# Patient Record
Sex: MALE | Race: BLACK OR AFRICAN AMERICAN | NOT HISPANIC OR LATINO | URBAN - METROPOLITAN AREA
[De-identification: names, ages, dates, MRNs, and addresses within clinical notes are randomized per-mention and may not be internally consistent; named-entity substitution may affect disease eponyms.]

---

## 2023-06-08 ENCOUNTER — OFFICE VISIT (OUTPATIENT)
Dept: PRIMARY CARE | Facility: CLINIC | Age: 38
End: 2023-06-08
Payer: MEDICARE

## 2023-06-08 VITALS
BODY MASS INDEX: 43.99 KG/M2 | DIASTOLIC BLOOD PRESSURE: 85 MMHG | HEART RATE: 71 BPM | WEIGHT: 297 LBS | TEMPERATURE: 97.3 F | SYSTOLIC BLOOD PRESSURE: 134 MMHG | HEIGHT: 69 IN

## 2023-06-08 DIAGNOSIS — H61.20 WAX IN EAR: ICD-10-CM

## 2023-06-08 DIAGNOSIS — Z00.00 HEALTHCARE MAINTENANCE: ICD-10-CM

## 2023-06-08 DIAGNOSIS — E78.9 LIPID DISORDER: ICD-10-CM

## 2023-06-08 DIAGNOSIS — E55.9 VITAMIN D DEFICIENCY: Primary | ICD-10-CM

## 2023-06-08 DIAGNOSIS — D64.9 ANEMIA, UNSPECIFIED TYPE: ICD-10-CM

## 2023-06-08 LAB
ALANINE AMINOTRANSFERASE (SGPT) (U/L) IN SER/PLAS: 26 U/L (ref 10–52)
ALBUMIN (G/DL) IN SER/PLAS: 4.4 G/DL (ref 3.4–5)
ALKALINE PHOSPHATASE (U/L) IN SER/PLAS: 44 U/L (ref 33–120)
ANION GAP IN SER/PLAS: 16 MMOL/L (ref 10–20)
ASPARTATE AMINOTRANSFERASE (SGOT) (U/L) IN SER/PLAS: 28 U/L (ref 9–39)
BASOPHILS (10*3/UL) IN BLOOD BY AUTOMATED COUNT: 0.04 X10E9/L (ref 0–0.1)
BASOPHILS/100 LEUKOCYTES IN BLOOD BY AUTOMATED COUNT: 0.7 % (ref 0–2)
BILIRUBIN TOTAL (MG/DL) IN SER/PLAS: 0.7 MG/DL (ref 0–1.2)
CALCIUM (MG/DL) IN SER/PLAS: 10 MG/DL (ref 8.6–10.6)
CARBON DIOXIDE, TOTAL (MMOL/L) IN SER/PLAS: 24 MMOL/L (ref 21–32)
CHLORIDE (MMOL/L) IN SER/PLAS: 102 MMOL/L (ref 98–107)
CHOLESTEROL (MG/DL) IN SER/PLAS: 201 MG/DL (ref 0–199)
CHOLESTEROL IN HDL (MG/DL) IN SER/PLAS: 39.4 MG/DL
CHOLESTEROL/HDL RATIO: 5.1
CREATININE (MG/DL) IN SER/PLAS: 1.09 MG/DL (ref 0.5–1.3)
EOSINOPHILS (10*3/UL) IN BLOOD BY AUTOMATED COUNT: 0.19 X10E9/L (ref 0–0.7)
EOSINOPHILS/100 LEUKOCYTES IN BLOOD BY AUTOMATED COUNT: 3.3 % (ref 0–6)
ERYTHROCYTE DISTRIBUTION WIDTH (RATIO) BY AUTOMATED COUNT: 13.5 % (ref 11.5–14.5)
ERYTHROCYTE MEAN CORPUSCULAR HEMOGLOBIN CONCENTRATION (G/DL) BY AUTOMATED: 32.4 G/DL (ref 32–36)
ERYTHROCYTE MEAN CORPUSCULAR VOLUME (FL) BY AUTOMATED COUNT: 83 FL (ref 80–100)
ERYTHROCYTES (10*6/UL) IN BLOOD BY AUTOMATED COUNT: 5.38 X10E12/L (ref 4.5–5.9)
GFR MALE: 90 ML/MIN/1.73M2
GLUCOSE (MG/DL) IN SER/PLAS: 77 MG/DL (ref 74–99)
HEMATOCRIT (%) IN BLOOD BY AUTOMATED COUNT: 44.8 % (ref 41–52)
HEMOGLOBIN (G/DL) IN BLOOD: 14.5 G/DL (ref 13.5–17.5)
IMMATURE GRANULOCYTES/100 LEUKOCYTES IN BLOOD BY AUTOMATED COUNT: 0.3 % (ref 0–0.9)
LDL: 138 MG/DL (ref 0–99)
LEUKOCYTES (10*3/UL) IN BLOOD BY AUTOMATED COUNT: 5.7 X10E9/L (ref 4.4–11.3)
LYMPHOCYTES (10*3/UL) IN BLOOD BY AUTOMATED COUNT: 1.96 X10E9/L (ref 1.2–4.8)
LYMPHOCYTES/100 LEUKOCYTES IN BLOOD BY AUTOMATED COUNT: 34.2 % (ref 13–44)
MONOCYTES (10*3/UL) IN BLOOD BY AUTOMATED COUNT: 0.41 X10E9/L (ref 0.1–1)
MONOCYTES/100 LEUKOCYTES IN BLOOD BY AUTOMATED COUNT: 7.2 % (ref 2–10)
NEUTROPHILS (10*3/UL) IN BLOOD BY AUTOMATED COUNT: 3.11 X10E9/L (ref 1.2–7.7)
NEUTROPHILS/100 LEUKOCYTES IN BLOOD BY AUTOMATED COUNT: 54.3 % (ref 40–80)
NRBC (PER 100 WBCS) BY AUTOMATED COUNT: 0 /100 WBC (ref 0–0)
PLATELETS (10*3/UL) IN BLOOD AUTOMATED COUNT: 288 X10E9/L (ref 150–450)
POTASSIUM (MMOL/L) IN SER/PLAS: 4.9 MMOL/L (ref 3.5–5.3)
PROTEIN TOTAL: 8.1 G/DL (ref 6.4–8.2)
SODIUM (MMOL/L) IN SER/PLAS: 137 MMOL/L (ref 136–145)
TRIGLYCERIDE (MG/DL) IN SER/PLAS: 117 MG/DL (ref 0–149)
UREA NITROGEN (MG/DL) IN SER/PLAS: 14 MG/DL (ref 6–23)
VLDL: 23 MG/DL (ref 0–40)

## 2023-06-08 PROCEDURE — 80053 COMPREHEN METABOLIC PANEL: CPT

## 2023-06-08 PROCEDURE — 99385 PREV VISIT NEW AGE 18-39: CPT | Performed by: FAMILY MEDICINE

## 2023-06-08 PROCEDURE — 3008F BODY MASS INDEX DOCD: CPT | Performed by: FAMILY MEDICINE

## 2023-06-08 PROCEDURE — 80061 LIPID PANEL: CPT

## 2023-06-08 PROCEDURE — 85025 COMPLETE CBC W/AUTO DIFF WBC: CPT

## 2023-06-08 NOTE — PROGRESS NOTES
I saw and evaluated the patient. I personally obtained the key and critical portions of the history and physical exam. I reviewed the student 's documentation and discussed the patient with the student. I agree with the student medical decision making as documented in the student's note

## 2023-06-08 NOTE — PROGRESS NOTES
Pt is a 36 yo male reporting as a new patient for a physical exam. He denies any chief complaint.     Pt's mom says he has a developmental disability that makes it harder to answer some medical questions.    [facility]  Pt had twisted left ankle in 2006 when he slipped on detergent at [facility], and he hurt it again at  around 2009. He had air cast and crutches, but it still bothers him sometimes when working.     I asked him to consider getting a specific brand of work shoes with cushioned insoles designed for standing for long periods of time.    Pt reports no known allergies.      Pt is generally happy although somewhat stressful at work as it was previously shorthanded. Pt drinks socially (3/week), uses marijuana.     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 He typically takes a nap afterward. Sleeps about 10-11pm-7am. Has TV on when he goes to sleep but the TV is set on a 30 min timer. Pt's mom says he does     We counseled the patient about having a blocked off time for going to the gym for exercise, as well as having well-scheduled time for sleep without TV right before bed.     FH  Youngest brother (30), older brother (42), youngest sister (26). Dad had thyroid and prostate cancer but is now cancer-free. Mom's father had colon cancer, mom had uterine cancer, congestive heart failure. Pt's paternal grandma had breast cancer. Mom had mild heart attack at 25 yo.     PE  Upon physical exam, observed significant amount of ear wax accumulation bilaterally. Pt's mom notes pt has had swimmer's ear. Lungs are clear to auscultation. Heart has normal S1 S2. Abdomen soft and non-tender. Upon palpation, there is a little collection of fluid in the scrotum.    We will refer pt to ENT doctor regarding his ear wax accumulation.

## 2023-06-08 NOTE — PATIENT INSTRUCTIONS
Please try to get some more exercise at the gym and walking/biking outside once you get that bike fixed!  Please see if you can make some gradual improvements to your diet with less fatty, greasy, and sugary foods. We want you to lose some weight and get your blood pressure down. You are still young, so now is the time to try and decrease your risk for things like heart disease and diabetes.   Try to set a block of sleep at night without TV to improve your sleep schedule and your rest.

## 2023-09-08 ENCOUNTER — APPOINTMENT (OUTPATIENT)
Dept: PRIMARY CARE | Facility: CLINIC | Age: 38
End: 2023-09-08
Payer: MEDICARE

## 2023-09-29 PROBLEM — F32.9 MAJOR DEPRESSIVE DISORDER, SINGLE EPISODE, UNSPECIFIED: Status: ACTIVE | Noted: 2021-05-27

## 2023-09-29 PROBLEM — K62.5 BRBPR (BRIGHT RED BLOOD PER RECTUM): Status: ACTIVE | Noted: 2023-09-29

## 2023-09-29 PROBLEM — K60.2 ANAL FISSURE: Status: ACTIVE | Noted: 2023-09-29

## 2023-09-29 PROBLEM — K59.09 CHRONIC CONSTIPATION: Status: ACTIVE | Noted: 2023-09-29

## 2023-09-29 RX ORDER — DOCUSATE SODIUM 100 MG/1
1 CAPSULE, LIQUID FILLED ORAL DAILY
COMMUNITY
Start: 2017-05-02

## 2023-10-02 ENCOUNTER — APPOINTMENT (OUTPATIENT)
Dept: OTOLARYNGOLOGY | Facility: CLINIC | Age: 38
End: 2023-10-02
Payer: MEDICARE

## 2023-12-08 ENCOUNTER — APPOINTMENT (OUTPATIENT)
Dept: PRIMARY CARE | Facility: CLINIC | Age: 38
End: 2023-12-08
Payer: MEDICARE

## 2023-12-20 ENCOUNTER — OFFICE VISIT (OUTPATIENT)
Dept: PRIMARY CARE | Facility: CLINIC | Age: 38
End: 2023-12-20
Payer: MEDICARE

## 2023-12-20 ENCOUNTER — APPOINTMENT (OUTPATIENT)
Dept: PRIMARY CARE | Facility: CLINIC | Age: 38
End: 2023-12-20
Payer: MEDICARE

## 2023-12-20 VITALS
TEMPERATURE: 97.8 F | SYSTOLIC BLOOD PRESSURE: 127 MMHG | BODY MASS INDEX: 41.77 KG/M2 | DIASTOLIC BLOOD PRESSURE: 82 MMHG | HEIGHT: 69 IN | WEIGHT: 282 LBS

## 2023-12-20 DIAGNOSIS — R19.7 DIARRHEA, UNSPECIFIED TYPE: ICD-10-CM

## 2023-12-20 DIAGNOSIS — R11.0 NAUSEA: Primary | ICD-10-CM

## 2023-12-20 DIAGNOSIS — R05.1 ACUTE COUGH: ICD-10-CM

## 2023-12-20 PROCEDURE — G0446 INTENS BEHAVE THER CARDIO DX: HCPCS | Performed by: FAMILY MEDICINE

## 2023-12-20 PROCEDURE — 99214 OFFICE O/P EST MOD 30 MIN: CPT | Performed by: FAMILY MEDICINE

## 2023-12-20 PROCEDURE — 3008F BODY MASS INDEX DOCD: CPT | Performed by: FAMILY MEDICINE

## 2023-12-20 NOTE — PROGRESS NOTES
This is a 38-year-old male patient who is accompanied by his sister    This is his second visit here    Mother has informed me that he is mentally challenged and not able to give me a good history that is the reason for the sister to accompany him mother is out of town    I reviewed his lab test with the sister and advised the coronary artery disease risk since he has a high LDL and low HDL and we talked at length about lifestyle changes        I discussed and assess the  risk factors for cardiovascular disease.  I educated patient on a healthier behavior lifestyle changes.   I advised patient to walk at least 30 minutes a day 5 days a week.  I educated on healthy eating habits and also taking a baby aspirin to avoid cardiovascular accident       For the last 2 days he has been suffering with gastrointestinal upset such as vomiting and diarrhea but today he is feeling much better and it is slowing down and he has not thrown up for today and no diarrhea    Also he has a cough no shortness of breath lung exam is normal      Vital signs stable heart lung exam and abdominal exam no significant findings    Advised him to be on brat diet    Advised him to come back in 3 months

## 2023-12-20 NOTE — PATIENT INSTRUCTIONS
Looking at your family history you have a lot of cancer in your family the only way you can avoid getting cancer is changing lifestyle    By losing weight through eating lots of green leafy vegetable daily and exercising 5 days a week to sweat out you will never ever ever get cancer    Sleeping you should make sure that you sleep no more than 7 hours a day around the same time    Please make time to go to the gym almost daily and please try to eat 2 cups of leafy green vegetables      Your total cholesterol was 201 and the good cholesterol was only 38 extremely low good cholesterol and high cholesterol is 138 risk of premature heart attack and stroke please change your lifestyle okay          Since is getting better I can be wristlet sure that he had caught a virus that is getting better    But what he is eating can make him get worse again so for the next 2 days I want him to eat bananas ,boiled rice. BOILED  potatoes and toast avoid any milk products for the next 2 days except yogurt after 2 days you can start stepping up to regular food      I would like you to come back in 3 months at that time I will check your cholesterol

## 2024-05-14 ENCOUNTER — OFFICE VISIT (OUTPATIENT)
Dept: PRIMARY CARE | Facility: CLINIC | Age: 39
End: 2024-05-14
Payer: MEDICARE

## 2024-05-14 VITALS
DIASTOLIC BLOOD PRESSURE: 90 MMHG | TEMPERATURE: 98.4 F | SYSTOLIC BLOOD PRESSURE: 131 MMHG | BODY MASS INDEX: 40.39 KG/M2 | HEART RATE: 76 BPM | HEIGHT: 69 IN | WEIGHT: 272.7 LBS

## 2024-05-14 DIAGNOSIS — R79.9 ABNORMAL FINDING OF BLOOD CHEMISTRY, UNSPECIFIED: ICD-10-CM

## 2024-05-14 DIAGNOSIS — Z00.00 ROUTINE GENERAL MEDICAL EXAMINATION AT A HEALTH CARE FACILITY: ICD-10-CM

## 2024-05-14 DIAGNOSIS — I10 ESSENTIAL (PRIMARY) HYPERTENSION: ICD-10-CM

## 2024-05-14 DIAGNOSIS — E78.9 LIPID DISORDER: Primary | ICD-10-CM

## 2024-05-14 DIAGNOSIS — R03.0 ELEVATED BLOOD PRESSURE READING WITHOUT DIAGNOSIS OF HYPERTENSION: Chronic | ICD-10-CM

## 2024-05-14 LAB
ALBUMIN SERPL BCP-MCNC: 4 G/DL (ref 3.4–5)
ALP SERPL-CCNC: 49 U/L (ref 33–120)
ALT SERPL W P-5'-P-CCNC: 21 U/L (ref 10–52)
ANION GAP SERPL CALC-SCNC: 14 MMOL/L (ref 10–20)
AST SERPL W P-5'-P-CCNC: 22 U/L (ref 9–39)
BASOPHILS # BLD AUTO: 0.04 X10*3/UL (ref 0–0.1)
BASOPHILS NFR BLD AUTO: 0.7 %
BILIRUB SERPL-MCNC: 0.9 MG/DL (ref 0–1.2)
BUN SERPL-MCNC: 11 MG/DL (ref 6–23)
CALCIUM SERPL-MCNC: 9.2 MG/DL (ref 8.6–10.6)
CHLORIDE SERPL-SCNC: 103 MMOL/L (ref 98–107)
CHOLEST SERPL-MCNC: 158 MG/DL (ref 0–199)
CHOLESTEROL/HDL RATIO: 4.2
CO2 SERPL-SCNC: 24 MMOL/L (ref 21–32)
CREAT SERPL-MCNC: 1.1 MG/DL (ref 0.5–1.3)
EGFRCR SERPLBLD CKD-EPI 2021: 89 ML/MIN/1.73M*2
EOSINOPHIL # BLD AUTO: 0.25 X10*3/UL (ref 0–0.7)
EOSINOPHIL NFR BLD AUTO: 4.1 %
ERYTHROCYTE [DISTWIDTH] IN BLOOD BY AUTOMATED COUNT: 13.5 % (ref 11.5–14.5)
EST. AVERAGE GLUCOSE BLD GHB EST-MCNC: 117 MG/DL
GLUCOSE SERPL-MCNC: 88 MG/DL (ref 74–99)
HBA1C MFR BLD: 5.7 %
HCT VFR BLD AUTO: 43.2 % (ref 41–52)
HDLC SERPL-MCNC: 37.3 MG/DL
HGB BLD-MCNC: 14 G/DL (ref 13.5–17.5)
IMM GRANULOCYTES # BLD AUTO: 0.01 X10*3/UL (ref 0–0.7)
IMM GRANULOCYTES NFR BLD AUTO: 0.2 % (ref 0–0.9)
LDLC SERPL CALC-MCNC: 95 MG/DL
LYMPHOCYTES # BLD AUTO: 2.49 X10*3/UL (ref 1.2–4.8)
LYMPHOCYTES NFR BLD AUTO: 40.8 %
MCH RBC QN AUTO: 27.2 PG (ref 26–34)
MCHC RBC AUTO-ENTMCNC: 32.4 G/DL (ref 32–36)
MCV RBC AUTO: 84 FL (ref 80–100)
MONOCYTES # BLD AUTO: 0.33 X10*3/UL (ref 0.1–1)
MONOCYTES NFR BLD AUTO: 5.4 %
NEUTROPHILS # BLD AUTO: 2.98 X10*3/UL (ref 1.2–7.7)
NEUTROPHILS NFR BLD AUTO: 48.8 %
NON HDL CHOLESTEROL: 121 MG/DL (ref 0–149)
NRBC BLD-RTO: 0 /100 WBCS (ref 0–0)
PLATELET # BLD AUTO: 267 X10*3/UL (ref 150–450)
POTASSIUM SERPL-SCNC: 4.1 MMOL/L (ref 3.5–5.3)
PROT SERPL-MCNC: 7.8 G/DL (ref 6.4–8.2)
RBC # BLD AUTO: 5.15 X10*6/UL (ref 4.5–5.9)
SODIUM SERPL-SCNC: 137 MMOL/L (ref 136–145)
TRIGL SERPL-MCNC: 130 MG/DL (ref 0–149)
VLDL: 26 MG/DL (ref 0–40)
WBC # BLD AUTO: 6.1 X10*3/UL (ref 4.4–11.3)

## 2024-05-14 PROCEDURE — 3008F BODY MASS INDEX DOCD: CPT | Performed by: FAMILY MEDICINE

## 2024-05-14 PROCEDURE — 3080F DIAST BP >= 90 MM HG: CPT | Performed by: FAMILY MEDICINE

## 2024-05-14 PROCEDURE — 36415 COLL VENOUS BLD VENIPUNCTURE: CPT

## 2024-05-14 PROCEDURE — 1036F TOBACCO NON-USER: CPT | Performed by: FAMILY MEDICINE

## 2024-05-14 PROCEDURE — 84443 ASSAY THYROID STIM HORMONE: CPT

## 2024-05-14 PROCEDURE — 85025 COMPLETE CBC W/AUTO DIFF WBC: CPT

## 2024-05-14 PROCEDURE — 3075F SYST BP GE 130 - 139MM HG: CPT | Performed by: FAMILY MEDICINE

## 2024-05-14 PROCEDURE — 83036 HEMOGLOBIN GLYCOSYLATED A1C: CPT

## 2024-05-14 PROCEDURE — 99395 PREV VISIT EST AGE 18-39: CPT | Performed by: FAMILY MEDICINE

## 2024-05-14 PROCEDURE — 80061 LIPID PANEL: CPT

## 2024-05-14 PROCEDURE — 80053 COMPREHEN METABOLIC PANEL: CPT

## 2024-05-14 NOTE — PROGRESS NOTES
"Subjective   Patient ID: Omari Mcneil is a 38 y.o. male who presents for Follow-up.    HPI     Review of Systems   All other systems reviewed and are negative.      Objective   /90   Pulse 76   Temp 36.9 °C (98.4 °F)   Ht 1.753 m (5' 9\")   Wt 124 kg (272 lb 11.2 oz)   BMI 40.27 kg/m²     Physical Exam  Cardiovascular:      Rate and Rhythm: Regular rhythm.   Pulmonary:      Breath sounds: Normal breath sounds.   Abdominal:      Palpations: Abdomen is soft.   Musculoskeletal:         General: Normal range of motion.      Cervical back: Normal range of motion.   Skin:     General: Skin is warm.   Neurological:      General: No focal deficit present.      Mental Status: He is alert.   Psychiatric:         Mood and Affect: Mood normal.         Assessment/Plan       Patient is accompanied by his mom for his annual physical exam    He has no complaints    Vital signs shows that his BMI is over 40     The last diastolic blood pressure is 90    He is mentally challenged but he is able to understand about diet and exercise    I will shari all his routine blood work advised mother to bring him back in 6 months         "

## 2024-05-15 LAB — TSH SERPL-ACNC: 0.86 MIU/L (ref 0.44–3.98)

## 2024-11-06 ENCOUNTER — APPOINTMENT (OUTPATIENT)
Dept: PRIMARY CARE | Facility: CLINIC | Age: 39
End: 2024-11-06
Payer: MEDICARE

## 2025-01-13 ENCOUNTER — APPOINTMENT (OUTPATIENT)
Dept: PRIMARY CARE | Facility: CLINIC | Age: 40
End: 2025-01-13
Payer: MEDICARE

## 2025-02-07 ENCOUNTER — APPOINTMENT (OUTPATIENT)
Dept: RADIOLOGY | Facility: HOSPITAL | Age: 40
End: 2025-02-07
Payer: MEDICARE

## 2025-02-07 ENCOUNTER — HOSPITAL ENCOUNTER (EMERGENCY)
Facility: HOSPITAL | Age: 40
Discharge: HOME | End: 2025-02-07
Attending: EMERGENCY MEDICINE
Payer: MEDICARE

## 2025-02-07 ENCOUNTER — APPOINTMENT (OUTPATIENT)
Dept: CARDIOLOGY | Facility: HOSPITAL | Age: 40
End: 2025-02-07
Payer: MEDICARE

## 2025-02-07 VITALS
HEART RATE: 68 BPM | DIASTOLIC BLOOD PRESSURE: 91 MMHG | TEMPERATURE: 97.7 F | RESPIRATION RATE: 20 BRPM | WEIGHT: 272 LBS | HEIGHT: 69 IN | BODY MASS INDEX: 40.29 KG/M2 | SYSTOLIC BLOOD PRESSURE: 163 MMHG | OXYGEN SATURATION: 98 %

## 2025-02-07 DIAGNOSIS — R60.0 LOCALIZED EDEMA: ICD-10-CM

## 2025-02-07 DIAGNOSIS — I82.4Y1 ACUTE DEEP VEIN THROMBOSIS (DVT) OF PROXIMAL VEIN OF RIGHT LOWER EXTREMITY (MULTI): Primary | ICD-10-CM

## 2025-02-07 PROCEDURE — 93971 EXTREMITY STUDY: CPT | Performed by: STUDENT IN AN ORGANIZED HEALTH CARE EDUCATION/TRAINING PROGRAM

## 2025-02-07 PROCEDURE — 93971 EXTREMITY STUDY: CPT

## 2025-02-07 PROCEDURE — 99285 EMERGENCY DEPT VISIT HI MDM: CPT | Mod: 25 | Performed by: EMERGENCY MEDICINE

## 2025-02-07 PROCEDURE — 2500000001 HC RX 250 WO HCPCS SELF ADMINISTERED DRUGS (ALT 637 FOR MEDICARE OP)

## 2025-02-07 PROCEDURE — 73590 X-RAY EXAM OF LOWER LEG: CPT | Mod: RIGHT SIDE | Performed by: RADIOLOGY

## 2025-02-07 PROCEDURE — 99284 EMERGENCY DEPT VISIT MOD MDM: CPT | Mod: 25

## 2025-02-07 PROCEDURE — 99285 EMERGENCY DEPT VISIT HI MDM: CPT | Performed by: EMERGENCY MEDICINE

## 2025-02-07 PROCEDURE — 73590 X-RAY EXAM OF LOWER LEG: CPT | Mod: RT

## 2025-02-07 PROCEDURE — 2500000002 HC RX 250 W HCPCS SELF ADMINISTERED DRUGS (ALT 637 FOR MEDICARE OP, ALT 636 FOR OP/ED)

## 2025-02-07 RX ORDER — ORPHENADRINE CITRATE 100 MG/1
100 TABLET, EXTENDED RELEASE ORAL ONCE
Status: COMPLETED | OUTPATIENT
Start: 2025-02-07 | End: 2025-02-07

## 2025-02-07 RX ADMIN — APIXABAN 10 MG: 5 TABLET, FILM COATED ORAL at 23:12

## 2025-02-07 RX ADMIN — ORPHENADRINE CITRATE 100 MG: 100 TABLET, EXTENDED RELEASE ORAL at 20:30

## 2025-02-07 ASSESSMENT — PAIN DESCRIPTION - LOCATION
LOCATION: LEG

## 2025-02-07 ASSESSMENT — PAIN DESCRIPTION - ORIENTATION
ORIENTATION: RIGHT
ORIENTATION: RIGHT

## 2025-02-07 ASSESSMENT — COLUMBIA-SUICIDE SEVERITY RATING SCALE - C-SSRS
1. IN THE PAST MONTH, HAVE YOU WISHED YOU WERE DEAD OR WISHED YOU COULD GO TO SLEEP AND NOT WAKE UP?: NO
2. HAVE YOU ACTUALLY HAD ANY THOUGHTS OF KILLING YOURSELF?: NO
6. HAVE YOU EVER DONE ANYTHING, STARTED TO DO ANYTHING, OR PREPARED TO DO ANYTHING TO END YOUR LIFE?: NO

## 2025-02-07 ASSESSMENT — PAIN DESCRIPTION - DESCRIPTORS: DESCRIPTORS: ACHING

## 2025-02-07 ASSESSMENT — PAIN DESCRIPTION - PROGRESSION: CLINICAL_PROGRESSION: GRADUALLY IMPROVING

## 2025-02-07 ASSESSMENT — PAIN SCALES - GENERAL
PAINLEVEL_OUTOF10: 5 - MODERATE PAIN
PAINLEVEL_OUTOF10: 4
PAINLEVEL_OUTOF10: 10 - WORST POSSIBLE PAIN
PAINLEVEL_OUTOF10: 9

## 2025-02-07 ASSESSMENT — PAIN - FUNCTIONAL ASSESSMENT
PAIN_FUNCTIONAL_ASSESSMENT: 0-10
PAIN_FUNCTIONAL_ASSESSMENT: 0-10

## 2025-02-07 ASSESSMENT — PAIN DESCRIPTION - PAIN TYPE
TYPE: ACUTE PAIN
TYPE: ACUTE PAIN

## 2025-02-07 ASSESSMENT — PAIN DESCRIPTION - FREQUENCY: FREQUENCY: INTERMITTENT

## 2025-02-07 ASSESSMENT — PAIN DESCRIPTION - ONSET: ONSET: ONGOING

## 2025-02-08 ENCOUNTER — APPOINTMENT (OUTPATIENT)
Dept: RADIOLOGY | Facility: HOSPITAL | Age: 40
End: 2025-02-08
Payer: MEDICARE

## 2025-02-08 ENCOUNTER — HOSPITAL ENCOUNTER (EMERGENCY)
Facility: HOSPITAL | Age: 40
Discharge: HOME | End: 2025-02-08
Attending: EMERGENCY MEDICINE
Payer: MEDICARE

## 2025-02-08 VITALS
RESPIRATION RATE: 18 BRPM | WEIGHT: 272 LBS | OXYGEN SATURATION: 98 % | SYSTOLIC BLOOD PRESSURE: 135 MMHG | TEMPERATURE: 99 F | HEART RATE: 74 BPM | HEIGHT: 69 IN | BODY MASS INDEX: 40.29 KG/M2 | DIASTOLIC BLOOD PRESSURE: 93 MMHG

## 2025-02-08 DIAGNOSIS — R51.9 OCCIPITAL HEADACHE: Primary | ICD-10-CM

## 2025-02-08 PROCEDURE — 70450 CT HEAD/BRAIN W/O DYE: CPT

## 2025-02-08 PROCEDURE — 70450 CT HEAD/BRAIN W/O DYE: CPT | Performed by: RADIOLOGY

## 2025-02-08 PROCEDURE — 99284 EMERGENCY DEPT VISIT MOD MDM: CPT | Mod: 25 | Performed by: EMERGENCY MEDICINE

## 2025-02-08 PROCEDURE — 2500000004 HC RX 250 GENERAL PHARMACY W/ HCPCS (ALT 636 FOR OP/ED)

## 2025-02-08 PROCEDURE — 96361 HYDRATE IV INFUSION ADD-ON: CPT

## 2025-02-08 PROCEDURE — 96374 THER/PROPH/DIAG INJ IV PUSH: CPT

## 2025-02-08 PROCEDURE — 99284 EMERGENCY DEPT VISIT MOD MDM: CPT | Performed by: EMERGENCY MEDICINE

## 2025-02-08 PROCEDURE — 96375 TX/PRO/DX INJ NEW DRUG ADDON: CPT

## 2025-02-08 RX ORDER — DIPHENHYDRAMINE HYDROCHLORIDE 50 MG/ML
25 INJECTION INTRAMUSCULAR; INTRAVENOUS ONCE
Status: COMPLETED | OUTPATIENT
Start: 2025-02-08 | End: 2025-02-08

## 2025-02-08 RX ORDER — PROCHLORPERAZINE EDISYLATE 5 MG/ML
10 INJECTION INTRAMUSCULAR; INTRAVENOUS ONCE
Status: COMPLETED | OUTPATIENT
Start: 2025-02-08 | End: 2025-02-08

## 2025-02-08 RX ADMIN — SODIUM CHLORIDE 500 ML: 9 INJECTION, SOLUTION INTRAVENOUS at 16:26

## 2025-02-08 RX ADMIN — DIPHENHYDRAMINE HYDROCHLORIDE 25 MG: 50 INJECTION, SOLUTION INTRAMUSCULAR; INTRAVENOUS at 16:26

## 2025-02-08 RX ADMIN — PROCHLORPERAZINE EDISYLATE 10 MG: 5 INJECTION INTRAMUSCULAR; INTRAVENOUS at 16:25

## 2025-02-08 ASSESSMENT — COLUMBIA-SUICIDE SEVERITY RATING SCALE - C-SSRS
2. HAVE YOU ACTUALLY HAD ANY THOUGHTS OF KILLING YOURSELF?: NO
1. IN THE PAST MONTH, HAVE YOU WISHED YOU WERE DEAD OR WISHED YOU COULD GO TO SLEEP AND NOT WAKE UP?: NO
6. HAVE YOU EVER DONE ANYTHING, STARTED TO DO ANYTHING, OR PREPARED TO DO ANYTHING TO END YOUR LIFE?: NO

## 2025-02-08 ASSESSMENT — PAIN SCALES - GENERAL
PAINLEVEL_OUTOF10: 8
PAINLEVEL_OUTOF10: 8
PAINLEVEL_OUTOF10: 2

## 2025-02-08 ASSESSMENT — PAIN - FUNCTIONAL ASSESSMENT
PAIN_FUNCTIONAL_ASSESSMENT: 0-10
PAIN_FUNCTIONAL_ASSESSMENT: 0-10

## 2025-02-08 ASSESSMENT — PAIN DESCRIPTION - DESCRIPTORS
DESCRIPTORS: HEADACHE
DESCRIPTORS: HEADACHE

## 2025-02-08 ASSESSMENT — PAIN DESCRIPTION - LOCATION: LOCATION: HEAD

## 2025-02-08 ASSESSMENT — PAIN DESCRIPTION - PAIN TYPE: TYPE: ACUTE PAIN

## 2025-02-08 NOTE — DISCHARGE INSTRUCTIONS
You are being started on blood thinners.  Please note that if you fall and hit your head on a blood thinner or have trauma to the head while taking the blood thinner you need to come back to the emergency department for reevaluation.  Otherwise he may have longer bleeding times if you excellently cut yourself.  If you have any vomiting blood, blood in your stool please come in for evaluation immediately.    In regards to the blood clot if you start to have any chest pain or shortness of breath please return to the ED for reevaluation.

## 2025-02-08 NOTE — ED PROVIDER NOTES
Emergency Department Provider Note        History of Present Illness     History provided by: Patient  Limitations to History: None  External Records Reviewed with Brief Summary: None    HPI:  Omari Mcneil is a 39 y.o. morbid obese male with a history of MDD, and recent diagnosis of DVT in the right lower extremity, who presents to the ED with complaint of headache.  Patient stated that he was sitting down watching TV when the headache started.  He decided to take a nap hoping that the headache will resolve, but the headache woke him up.  States that the headache started on the occipital and radiates to the top of his head.  Patient was seen in the ED yesterday for right lower extremity pain, and was diagnosed with DVT.  He was started on anticoagulant with 10 mg of Eliquis.  He received a prescription to continue outpatient treatment, but has not picked it up yet.    Physical Exam   Triage vitals:  T 37.2 °C (99 °F)  HR 74  BP (!) 154/100  RR 18  O2 94 % None (Room air)    General: Awake, alert, in no acute distress  Eyes: Gaze conjugate.  No scleral icterus or injection  HENT: Normo-cephalic, atraumatic. No stridor  CV: Regular rate, regular rhythm. Radial pulses 2+ bilaterally  Resp: Breathing non-labored, speaking in full sentences.  Clear to auscultation bilaterally  GI: Soft, non-distended, non-tender. No rebound or guarding.  : Not examined  MSK/Extremities: No gross bony deformities. Moving all extremities  Skin: Warm. Appropriate color  Neuro: Alert. Oriented. Face symmetric. Speech is fluent.  Gross strength and sensation intact in b/l UE and LEs  Psych: Appropriate mood and affect    Medical Decision Making & ED Course   Medical Decision Makin y.o. male ***    On arrival to the ED, the patient was stable, A&Ox3, non-toxic, well-appearing, and in no acute distress. Primary assessment is evident for intact ABC. The patient was able to give account of current event and verbalize presenting  symptoms.    My personal assessment is directed towards ordering labs including CBC, CMP, coagulation status and key cardiac enzyme to rule out anemia, electrolyte abnormality, and cardiac event. Also, I ordered EKG and chest x-ray to rule out possible cardiopulmonary abnormality, or critical esophageal etiology.    Lab shows no evidence of significant anemia,  ----  Differential diagnoses considered include but are not limited to: ***    Care Considerations: As documented above in [facility]    ED Course:  Diagnoses as of 02/08/25 1903   Occipital headache     Disposition   As a result of the work-up, the patient was discharged home.  he was informed of his diagnosis and instructed to come back with any concerns or worsening of condition.  he and was agreeable to the plan as discussed above.  he was given the opportunity to ask questions.  All of the patient's questions were answered.    Procedures   Procedures    This was a shared visit with an ED attending.  The patient was seen and discussed with the ED attending Dr. Cheatham.    Eyal Vickers MD  Emergency Medicine, PGY-2   resident/fellow.  I have personally performed a substantive portion of the encounter.  I have seen and examined the patient; agree with the workup, evaluation, MDM, management and diagnosis.  The care plan has been discussed with the resident; I have reviewed the resident’s note and agree with the documented findings.                                                    Alan Cheatham,   02/11/25 [id number]

## 2025-02-08 NOTE — ED PROVIDER NOTES
EMERGENCY DEPARTMENT ENCOUNTER      Pt Name: Omari Mnceil  MRN: [id number]  Birthdate 1985  Date of evaluation: 2/7/2025    HISTORY OF PRESENT ILLNESS    Omari Mcneil is an 39 y.o. male with history including developmental delay presenting to the emergency department for pain to his right lower extremity.  2 days ago patient started working out which she had not done in a couple months.  After the workout he started to have pain in his right lower extremity.  He went to an urgent care that evening where they told him it was a muscle strain and discharged him home.  Patient was instructed to take Aleve.  He tried taking some earlier today with no improvement of his pain.  Patient states that he cannot ambulate secondary to the pain and discomfort.  He denies feeling a pop or pulling sensation during his workout.  Patient did not fall or injure himself.  No recent travel or surgeries.    PAST MEDICAL HISTORY   No past medical history on file.    SURGICAL HISTORY       Past Surgical History:   Procedure Laterality Date    NO PAST SURGERIES         CURRENT MEDICATIONS       Previous Medications    DOCUSATE SODIUM (COLACE) 100 MG CAPSULE    Take 1 capsule (100 mg) by mouth once daily.    DOCUSATE SODIUM (COLACE) 50 MG CAPSULE    Take 1 capsule (50 mg) by mouth as needed at bedtime for constipation.    PSYLLIUM (KONSYL) 6 GRAM PACKET    Take by mouth once daily. Take 1 spoonful daily with glass of water  100% powder       ALLERGIES     Patient has no known allergies.    FAMILY HISTORY       Family History   Problem Relation Name Age of Onset    Heart attack Mother      Hypothyroidism Mother      Other (prediabetes) Mother      Uterine cancer Maternal Grandmother      Colon cancer Maternal Grandfather      Hypertension Paternal Grandfather          SOCIAL HISTORY       Social History     Socioeconomic History    Marital status: Single   Tobacco Use    Smoking status: Never    Smokeless tobacco: Never   Substance  and Sexual Activity    Alcohol use: Yes    Drug use: Yes     Types: Marijuana     Social Drivers of Health     Financial Resource Strain: Not on File (2021)    [facility] from [facility]    Financial Resource Strain     Financial Resource Strain: 0   Food Insecurity: Not on File (2021)    Received from [facility]    Food Insecurity     Food: 0   Transportation Needs: Not on File (2021)    Received from [facility]    Transportation Needs     Transportation: 0   Physical Activity: Not on File (2021)    Received from [facility]    Physical Activity     Physical Activity: 0   Stress: Not on File (2021)    Received from [facility]    Stress     Stress: 0   Social Connections: Not on File (2021)    Received from [facility]    Social Connections     Social Connections and Isolation: 0   Housing Stability: Not on File (2021)    Received from [facility]    Housing Stability     Housin       PHYSICAL EXAM       ED Triage Vitals [25]   Temperature Heart Rate Respirations BP   36.3 °C (97.3 °F) 85 20 (!) 178/102      Pulse Ox Temp Source Heart Rate Source Patient Position   99 % Temporal Monitor Sitting      BP Location FiO2 (%)     Right arm --       Physical Exam  Vitals and nursing note reviewed.   Constitutional:       General: He is not in acute distress.     Appearance: He is well-developed.   HENT:      Head: Normocephalic and atraumatic.   Cardiovascular:      Rate and Rhythm: Normal rate and regular rhythm.      Heart sounds: No murmur heard.  Pulmonary:      Effort: Pulmonary effort is normal. No respiratory distress.      Breath sounds: Normal breath sounds.   Musculoskeletal:         General: Swelling (Compared to the left lower extremity) and tenderness (Calf) present.   Skin:     General: Skin is warm and dry.      Capillary Refill: Capillary refill takes less than 2 seconds.   Neurological:      General: No focal deficit present.      Mental Status: He  is alert and oriented to person, place, and time.   Psychiatric:         Mood and Affect: Mood normal.          DIAGNOSTIC RESULTS     LABS:  Labs Reviewed - No data to display    All other labs were within normal range or not returned as of this dictation.    Imaging  No orders to display        Procedures  Procedures     EMERGENCY DEPARTMENT COURSE/MDM:   Medical Decision Making  Omari Mcneil is an 39 y.o. male with history including developmental delay presenting to the emergency department for pain to his right lower extremity.  On examination patient does have tenderness to palpation over the whole right lower extremity.  He has no tenderness at the knee joint or above.  Patient has good palpable pulses.  Compartments all appear to be soft.  There is some marginal swelling on the right lower extremity compared to his left lower extremity.  Given the swelling x-ray and ultrasound ordered.    X-ray imaging shows some soft tissue swelling but no other acute findings.  Ultrasound is positive for DVT of the mid and distal femoral vein, peroneal vein, posterior tibialis vein, gastroc vein, and popliteal vein.  Given the extensive nature of the DVT involvement spoke [place] vascular surgeon [place] Dr. Serna.  They do not recommend any procedural intervention at this time.  Patient started on Eliquis given first dose here.  Given strict instructions in regards to blood thinners to the patient and the patient's sister and mother.  All seem to understand risk of being on a blood thinner.  Recommend they follow-up with the PCP in regards to this ED visit.  I did confirm that patient is not having any chest pain or shortness of breath prior to discharge.            Diagnoses as of 02/08/25 1934   Acute deep vein thrombosis (DVT) of proximal vein of right lower extremity (Multi)        External records reviewed: recent inpatient, clinic, and prior ED notes  Labs and Diagnostic imaging independently reviewed/interpreted by  me.    Patient plan, care, lab results and imaging were all discussed with attending.    ED Medications administered this visit:  Medications - No data to display  New Prescriptions from this visit:    New Prescriptions    No medications on file       (Please note that portions of this note were completed with a voice recognition program.  Efforts were made to edit the dictations but occasionally words are mis-transcribed.)     Dianne Gayle DO  Resident  02/08/25 1952

## 2025-02-08 NOTE — DISCHARGE INSTRUCTIONS
Thank you for giving us the opportunity to take care of you. Your CT scan does not show intracranial bleeding or fracture or any acute abnormality.  You will need to follow-up with your primary care physician within 48 hours regarding your ED visit.    Use over-the-counter Tylenol for headache as needed until you see your primary care.    Seek immediate medical attention if you experience worsening headache, blurry vision, pain in your eyes, neck pain, numbness, tingling or weakness in your hands or your legs, or loss of balance.

## 2025-02-10 ENCOUNTER — OFFICE VISIT (OUTPATIENT)
Dept: PRIMARY CARE | Facility: CLINIC | Age: 40
End: 2025-02-10
Payer: MEDICARE

## 2025-02-10 VITALS
HEIGHT: 70 IN | TEMPERATURE: 96.5 F | DIASTOLIC BLOOD PRESSURE: 88 MMHG | SYSTOLIC BLOOD PRESSURE: 128 MMHG | WEIGHT: 276.2 LBS | BODY MASS INDEX: 39.54 KG/M2 | HEART RATE: 93 BPM

## 2025-02-10 DIAGNOSIS — I82.401 ACUTE DEEP VEIN THROMBOSIS (DVT) OF RIGHT LOWER EXTREMITY, UNSPECIFIED VEIN (MULTI): ICD-10-CM

## 2025-02-10 PROCEDURE — 1036F TOBACCO NON-USER: CPT | Performed by: FAMILY MEDICINE

## 2025-02-10 PROCEDURE — 3008F BODY MASS INDEX DOCD: CPT | Performed by: FAMILY MEDICINE

## 2025-02-10 PROCEDURE — 99214 OFFICE O/P EST MOD 30 MIN: CPT | Performed by: FAMILY MEDICINE

## 2025-02-10 ASSESSMENT — PAIN SCALES - GENERAL: PAINLEVEL_OUTOF10: 4

## 2025-02-10 ASSESSMENT — PATIENT HEALTH QUESTIONNAIRE - PHQ9
1. LITTLE INTEREST OR PLEASURE IN DOING THINGS: NOT AT ALL
SUM OF ALL RESPONSES TO PHQ9 QUESTIONS 1 AND 2: 0
2. FEELING DOWN, DEPRESSED OR HOPELESS: NOT AT ALL

## 2025-02-10 NOTE — PROGRESS NOTES
He was diagnosed of right leg DVT on February 4    He is on Eliquis    He is accompanied by his mother    Although he has a history of major depression in his problem list mother says he has no problem with his mood anymore and not on any medication    He is working at a nursing home    On exam his right calf is still slightly swollen but no pain    Explained to mother and the patient his weight is a big contribution towards DVT development therefore I referred him to clinical pharmacy to see whether he can get on GLP-1 for weight management    I also referred him to endocrinology weight management clinic    Referred him to sleep apnea specialist as well to rule out sleep apnea    Referred him to hematology for further workup on coagulation issues    Mother wanted him to be off till February 24    But I advised him to keep moving keep walking and also wear the support stocking    Time was given off to get himself settled in to get all the appointments organized    Advised mother to bring him back in April for his follow-up

## 2025-02-10 NOTE — LETTER
February 10, 2025     Patient: Omari Mcneil   YOB: 1985   Date of Visit: 2/10/2025       To Whom It May Concern:    Omari Mcneil was seen in my clinic on 2/10/2025 at 3:30 pm. Please excuse Omari for his absence from work on this day to make the appointment.    If you have any questions or concerns, please don't hesitate to call.    Please aashish medical leave from February 4 th until February 24th      Thank you       Sincerely,         Patricia Bonilla MD        CC: No Recipients

## 2025-02-12 NOTE — PROGRESS NOTES
Clinical Pharmacy Appointment    Patient ID: Omari Mcneil is a 39 y.o. male who presents for Weight Management.    Pt is here for First appointment.      Referring Provider: Patricia Bonilla  PCP: Patricia Bonilla MD   Last visit with PCP: 2/10/25   Next visit with PCP: 4/22/25      Subjective     WEIGHT LOSS  BMI Readings from Last 3 Encounters:   02/10/25 40.20 kg/m²   02/08/25 40.17 kg/m²   02/07/25 40.17 kg/m²      Starting weight: 276 lbs. (2/12/25)  Current weight: 276 lbs.    Lifestyle  Diet: 2 meals/day.   BK: skips,   LN: sandwich and chips, cooked by mom   DN: cooked by mom- tries to keep balanced  Snacks: cookies, chips, nuts   Drinks: water   Has worked with nutritionist/dietician? 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    Other Potential Contributing Factors  Alcohol use: Average rarely drinks/week  Tobacco use: No  Other drug use: Yes - marijuana rarely  Medications that may cause weight gain: none  Mental health: No current concerns  Eating Disorders: Denies Hx of any eating disorder  Comorbidities: Comorbidities: DVT/PEDVT and takes chronic anticoagulants  and high cholesterol      Non-Pharmacological Therapy  Weight loss techniques attempted:  Self-directed dieting: successful in the past   Commercial weight loss program: successful in the past     Pharmacological Therapy  Current Medications: None   Adverse Effects: N/a  Previous Medications: None      Insurance coverage of weight-loss medications? No, none at this time  Eligible for copay cards/programs? Yes, cost still high  Eligible for  PAP? No, no insurance coverage    Medication Estimated Cost Expected weight loss Patient Risks/Cautions Notes   Wegovy (semaglutide) ~$650/month w/ savings card 10-20% None      Zepbound (tirzepatide) $650/month   w/ savings card 10-20%     Qsymia (phentermine-topiramate) $98/month via [employer]ia [employer] 8-10% None  "Controlled substance   Contrave (bupropion-naltrexone) $99/month   via [employer] 5-10% None    Adipex (phentermine) ~$15/month 3-5% None Controlled substance,  Short-term use only   Fahad (OTC Orlistat) ~$60/month 3-5%  Adverse effects likely outweigh benefit.         Drug Interactions  No relevant drug interactions were noted.      Objective   No Known Allergies  Social History     Social History Narrative    Not on file      Medication Review  Current Outpatient Medications   Medication Instructions    apixaban (Eliquis) 5 mg tablet Take 2 tablets (10 mg) by mouth 2 times a day for 7 days, THEN 1 tablet (5 mg) 2 times a day for 23 days.    apixaban (ELIQUIS) 5 mg, oral, 2 times daily    docusate sodium (Colace) 100 mg capsule 1 capsule, Daily    docusate sodium (Colace) 50 mg capsule 1 capsule, Nightly PRN    psyllium (Konsyl) 6 gram packet Daily      Vitals  BP Readings from Last 2 Encounters:   02/10/25 128/88   02/08/25 (!) 135/93     BMI Readings from Last 1 Encounters:   02/10/25 40.20 kg/m²      Labs  A1C  Lab Results   Component Value Date    HGBA1C 5.7 (H) 05/14/2024    HGBA1C 5.4 01/21/2021     BMP  Lab Results   Component Value Date    CALCIUM 9.2 05/14/2024     05/14/2024    K 4.1 05/14/2024    CO2 24 05/14/2024     05/14/2024    BUN 11 05/14/2024    CREATININE 1.10 05/14/2024    EGFR 89 05/14/2024     LFTs  Lab Results   Component Value Date    ALT 21 05/14/2024    AST 22 05/14/2024    ALKPHOS 49 05/14/2024    BILITOT 0.9 05/14/2024     FLP  Lab Results   Component Value Date    TRIG 130 05/14/2024    CHOL 158 05/14/2024    LDLF 138 (H) 06/08/2023    LDLCALC 95 05/14/2024    HDL 37.3 05/14/2024     Urine Microalbumin  No results found for: \"MICROALBCREA\"  Weight Management  Wt Readings from Last 3 Encounters:   02/10/25 125 kg (276 lb 3.2 oz)   02/08/25 123 kg (272 lb)   02/07/25 123 kg (272 lb)      There is no height or weight on file to calculate BMI.     Assessment/Plan   Problem List " Items Addressed This Visit       Obesity, unspecified (Chronic)     Current regimen includes no medication at this time. Patient insurance does not cover any weight loss medication without comorbidity. Patient's current weight reported as 276. Due to lack of insurance coverage for weight loss medications and no recent lab work, plan to check A1c prior to making treatment decisions.     Medication Changes:  Future Considerations:  Check A1c this week to review comorbid conditions.    Monitoring and Education:  Counseled patient on relevant medication mechanisms of action, expectations, side effects, duration of therapy, contraindications, administration, and monitoring parameters.  All questions and concerns addressed. Contact pharmacist with any further questions or concerns prior to next appointment.  Reviewed dietary recommendations: Healthy Plate method           Other Visit Diagnoses       BMI 40.0-44.9, adult (Multi)    -  Primary    Relevant Orders    Hemoglobin A1c    Referral to Clinical Pharmacy    Prediabetes        Relevant Orders    Hemoglobin A1c            Clinical Pharmacist follow-up: 2/19 @ 2:20pm, Telehealth visit    Continue all meds under the continuation of care with the referring provider and clinical pharmacy team.    Thank you,  Glory Garibay, PharmD  Clinical Pharmacist  [phone] (Office Phone)   [phone] (Fax)   [email or web address]     Verbal consent to manage patient's drug therapy was obtained from the patient. They were informed they may decline to participate or withdraw from participation in pharmacy services at any time.

## 2025-02-13 ENCOUNTER — TELEMEDICINE (OUTPATIENT)
Dept: PHARMACY | Facility: HOSPITAL | Age: 40
End: 2025-02-13
Payer: MEDICARE

## 2025-02-13 DIAGNOSIS — R73.03 PREDIABETES: ICD-10-CM

## 2025-02-13 DIAGNOSIS — E66.9 OBESITY, UNSPECIFIED CLASS, UNSPECIFIED OBESITY TYPE, UNSPECIFIED WHETHER SERIOUS COMORBIDITY PRESENT: Chronic | ICD-10-CM

## 2025-02-13 NOTE — ASSESSMENT & PLAN NOTE
Current regimen includes no medication at this time. Patient insurance does not cover any weight loss medication without comorbidity. Patient's current weight reported as 276. Due to lack of insurance coverage for weight loss medications and no recent lab work, plan to check A1c prior to making treatment decisions.     Medication Changes:  Future Considerations:  Check A1c this week to review comorbid conditions.    Monitoring and Education:  Counseled patient on relevant medication mechanisms of action, expectations, side effects, duration of therapy, contraindications, administration, and monitoring parameters.  All questions and concerns addressed. Contact pharmacist with any further questions or concerns prior to next appointment.  Reviewed dietary recommendations: Healthy Plate method

## 2025-02-19 ENCOUNTER — APPOINTMENT (OUTPATIENT)
Dept: PHARMACY | Facility: HOSPITAL | Age: 40
End: 2025-02-19
Payer: MEDICARE

## 2025-02-19 DIAGNOSIS — E66.9 OBESITY, UNSPECIFIED CLASS, UNSPECIFIED OBESITY TYPE, UNSPECIFIED WHETHER SERIOUS COMORBIDITY PRESENT: Primary | Chronic | ICD-10-CM

## 2025-02-19 NOTE — PROGRESS NOTES
Clinical Pharmacy Appointment    Patient ID: Omari Mcneil is a 39 y.o. male who presents for Weight Management.    Pt is here for Follow Up appointment. Patient was last seen by clinical pharmacist 2/13/25 at which time no medication changes were made. The patient is currently on no medications for weight management. The patient was to get lab work done prior to the next appointment to discuss GLP-1 coverage.    Since this visit, the patient was unable to get lab work due to being sick with the flu. The patient reports that he would like to get his lab work in the next week, and discuss at that time.     Referring Provider: Patricia Bonilla  PCP: Patricia Bonilla MD   Last visit with PCP: 2/10/25   Next visit with PCP: 4/22/25      Subjective     WEIGHT LOSS  BMI Readings from Last 3 Encounters:   02/10/25 40.20 kg/m²   02/08/25 40.17 kg/m²   02/07/25 40.17 kg/m²      Starting weight: 276 lbs. (2/12/25)  Current weight: 276 lbs.    Lifestyle  Diet: 2 meals/day.   BK: skips,   LN: sandwich and chips, cooked by mom   DN: cooked by mom- tries to keep balanced  Snacks: cookies, chips, nuts   Drinks: water   Has worked with nutritionist/dietician? 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    Other Potential Contributing Factors  Alcohol use: Average rarely drinks/week  Tobacco use: No  Other drug use: Yes - marijuana rarely  Medications that may cause weight gain: none  Mental health: No current concerns  Eating Disorders: Denies Hx of any eating disorder  Comorbidities: Comorbidities: DVT/PEDVT and takes chronic anticoagulants  and high cholesterol      Non-Pharmacological Therapy  Weight loss techniques attempted:  Self-directed dieting: successful in the past   Commercial weight loss program: successful in the past     Pharmacological Therapy  Current Medications: None   Adverse Effects: N/a  Previous Medications: None       Insurance coverage of weight-loss medications? No, none at this time  Eligible for copay cards/programs? Yes, cost still high  Eligible for  PAP? No, no insurance coverage    Medication Estimated Cost Expected weight loss Patient Risks/Cautions Notes   Wegovy (semaglutide) ~$650/month w/ savings card 10-20% None      Zepbound (tirzepatide) $650/month   w/ savings card 10-20%     Qsymia (phentermine-topiramate) $98/month via [employer] 8-10% None Controlled substance   Contrave (bupropion-naltrexone) $99/month   via [employer] 5-10% None    Adipex (phentermine) ~$15/month 3-5% None Controlled substance,  Short-term use only   Fahad (OTC Orlistat) ~$60/month 3-5%  Adverse effects likely outweigh benefit.         Drug Interactions  No relevant drug interactions were noted.      Objective   No Known Allergies  Social History     Social History Narrative    Not on file      Medication Review  Current Outpatient Medications   Medication Instructions    apixaban (Eliquis) 5 mg tablet Take 2 tablets (10 mg) by mouth 2 times a day for 7 days, THEN 1 tablet (5 mg) 2 times a day for 23 days.    apixaban (ELIQUIS) 5 mg, oral, 2 times daily    docusate sodium (Colace) 100 mg capsule 1 capsule, Daily    docusate sodium (Colace) 50 mg capsule 1 capsule, Nightly PRN    psyllium (Konsyl) 6 gram packet Daily      Vitals  BP Readings from Last 2 Encounters:   02/10/25 128/88   02/08/25 (!) 135/93     BMI Readings from Last 1 Encounters:   02/10/25 40.20 kg/m²      Labs  A1C  Lab Results   Component Value Date    HGBA1C 5.7 (H) 05/14/2024    HGBA1C 5.4 01/21/2021     BMP  Lab Results   Component Value Date    CALCIUM 9.2 05/14/2024     05/14/2024    K 4.1 05/14/2024    CO2 24 05/14/2024     05/14/2024    BUN 11 05/14/2024    CREATININE 1.10 05/14/2024    EGFR 89 05/14/2024     LFTs  Lab Results   Component Value Date    ALT 21 05/14/2024    AST 22 05/14/2024    ALKPHOS 49 05/14/2024    BILITOT 0.9 05/14/2024  "    FLP  Lab Results   Component Value Date    TRIG 130 05/14/2024    CHOL 158 05/14/2024    LDLF 138 (H) 06/08/2023    LDLCALC 95 05/14/2024    HDL 37.3 05/14/2024     Urine Microalbumin  No results found for: \"MICROALBCREA\"  Weight Management  Wt Readings from Last 3 Encounters:   02/10/25 125 kg (276 lb 3.2 oz)   02/08/25 123 kg (272 lb)   02/07/25 123 kg (272 lb)      There is no height or weight on file to calculate BMI.     Assessment/Plan   Problem List Items Addressed This Visit       Obesity, unspecified - Primary (Chronic)     Current regimen includes no medication at this time. Patient insurance does not cover any weight loss medication without comorbidity. Patient's current weight reported as 276. Due to lack of insurance coverage for weight loss medications and no recent lab work, plan to check A1c prior to making treatment decisions.     Medication Changes:  Future Considerations:  Check A1c this week to review comorbid conditions.    Monitoring and Education:  Counseled patient on relevant medication mechanisms of action, expectations, side effects, duration of therapy, contraindications, administration, and monitoring parameters.  All questions and concerns addressed. Contact pharmacist with any further questions or concerns prior to next appointment.  Reviewed dietary recommendations: Healthy Plate method          Relevant Orders    Referral to Clinical Pharmacy    BMI 40.0-44.9, adult (Multi)    Relevant Orders    Referral to Clinical Pharmacy         Clinical Pharmacist follow-up: 2/19 @ 2:20pm, Telehealth visit    Continue all meds under the continuation of care with the referring provider and clinical pharmacy team.    Thank you,  Glory Garibay, PharmD  Clinical Pharmacist  [phone] (Office Phone)   [phone] (Fax)   [email or web address]     Verbal consent to manage patient's drug therapy was obtained from the patient. They were informed they may decline to participate " or withdraw from participation in pharmacy services at any time.

## 2025-02-21 ENCOUNTER — APPOINTMENT (OUTPATIENT)
Dept: PHARMACY | Facility: HOSPITAL | Age: 40
End: 2025-02-21
Payer: MEDICARE

## 2025-02-22 LAB
EST. AVERAGE GLUCOSE BLD GHB EST-MCNC: 117 MG/DL
EST. AVERAGE GLUCOSE BLD GHB EST-SCNC: 6.5 MMOL/L
HBA1C MFR BLD: 5.7 % OF TOTAL HGB

## 2025-02-26 ENCOUNTER — APPOINTMENT (OUTPATIENT)
Dept: PHARMACY | Facility: HOSPITAL | Age: 40
End: 2025-02-26
Payer: MEDICARE

## 2025-02-26 DIAGNOSIS — E66.9 OBESITY, UNSPECIFIED CLASS, UNSPECIFIED OBESITY TYPE, UNSPECIFIED WHETHER SERIOUS COMORBIDITY PRESENT: Chronic | ICD-10-CM

## 2025-02-26 NOTE — PROGRESS NOTES
Clinical Pharmacy Appointment    Patient ID: Omari Mcneil is a 39 y.o. male who presents for Weight Management.    Pt is here for Follow Up appointment. Patient was last seen by clinical pharmacist 2/13/25 at which time no medication changes were made. The patient is currently on no medications for weight management. The patient was to get lab work done prior to the next appointment to discuss GLP-1 coverage.    Since this visit, the patient got updated lab work completed and A1c was slightly elevated, but not indicative of T2DM. Weight loss injectables would not be covered at this time.     The patient reports he has lost ~30 pounds since starting this journey, and he would like to continue with lifestyle modification at this time.     Referring Provider: Patricia Bonilla  PCP: Patricia Bonilla MD   Last visit with PCP: 2/10/25   Next visit with PCP: 4/22/25      Subjective     WEIGHT LOSS  BMI Readings from Last 3 Encounters:   02/10/25 40.20 kg/m²   02/08/25 40.17 kg/m²   02/07/25 40.17 kg/m²      Starting weight: 276 lbs. (2/12/25)  Current weight: 276 lbs.    Lifestyle  Diet: 2 meals/day.   BK: skips,   LN: sandwich and chips, cooked by mom   DN: cooked by mom- tries to keep balanced  Snacks: cookies, chips, nuts   Drinks: water   Has worked with nutritionist/dietician? 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    Other Potential Contributing Factors  Alcohol use: Average rarely drinks/week  Tobacco use: No  Other drug use: Yes - marijuana rarely  Medications that may cause weight gain: none  Mental health: No current concerns  Eating Disorders: Denies Hx of any eating disorder  Comorbidities: Comorbidities: DVT/PEDVT and takes chronic anticoagulants  and high cholesterol      Non-Pharmacological Therapy  Weight loss techniques attempted:  Self-directed dieting: successful in the past   Commercial weight loss program:  successful in the past     Pharmacological Therapy  Current Medications: None   Adverse Effects: N/a  Previous Medications: None      Insurance coverage of weight-loss medications? No, none at this time  Eligible for copay cards/programs? Yes, cost still high  Eligible for  PAP? No, no insurance coverage    Medication Estimated Cost Expected weight loss Patient Risks/Cautions Notes   Wegovy (semaglutide) ~$650/month w/ savings card 10-20% None      Zepbound (tirzepatide) $650/month   w/ savings card 10-20%     Qsymia (phentermine-topiramate) $98/month via [employer]symia [employer] 8-10% None Controlled substance   Contrave (bupropion-naltrexone) $99/month   via [employer] 5-10% None    Adipex (phentermine) ~$15/month 3-5% None Controlled substance,  Short-term use only   Fahad (OTC Orlistat) ~$60/month 3-5%  Adverse effects likely outweigh benefit.         Drug Interactions  No relevant drug interactions were noted.      Objective   No Known Allergies  Social History     Social History Narrative    Not on file      Medication Review  Current Outpatient Medications   Medication Instructions    apixaban (Eliquis) 5 mg tablet Take 2 tablets (10 mg) by mouth 2 times a day for 7 days, THEN 1 tablet (5 mg) 2 times a day for 23 days.    apixaban (ELIQUIS) 5 mg, oral, 2 times daily    docusate sodium (Colace) 100 mg capsule 1 capsule, Daily    docusate sodium (Colace) 50 mg capsule 1 capsule, Nightly PRN    psyllium (Konsyl) 6 gram packet Daily      Vitals  BP Readings from Last 2 Encounters:   02/10/25 128/88   02/08/25 (!) 135/93     BMI Readings from Last 1 Encounters:   02/10/25 40.20 kg/m²      Labs  A1C  Lab Results   Component Value Date    HGBA1C 5.7 (H) 02/21/2025    HGBA1C 5.7 (H) 05/14/2024    HGBA1C 5.4 01/21/2021     BMP  Lab Results   Component Value Date    CALCIUM 9.2 05/14/2024     05/14/2024    K 4.1 05/14/2024    CO2 24 05/14/2024     05/14/2024    BUN 11 05/14/2024    CREATININE 1.10 05/14/2024    EGFR  "89 05/14/2024     LFTs  Lab Results   Component Value Date    ALT 21 05/14/2024    AST 22 05/14/2024    ALKPHOS 49 05/14/2024    BILITOT 0.9 05/14/2024     FLP  Lab Results   Component Value Date    TRIG 130 05/14/2024    CHOL 158 05/14/2024    LDLF 138 (H) 06/08/2023    LDLCALC 95 05/14/2024    HDL 37.3 05/14/2024     Urine Microalbumin  No results found for: \"MICROALBCREA\"  Weight Management  Wt Readings from Last 3 Encounters:   02/10/25 125 kg (276 lb 3.2 oz)   02/08/25 123 kg (272 lb)   02/07/25 123 kg (272 lb)      There is no height or weight on file to calculate BMI.     Assessment/Plan   Problem List Items Addressed This Visit       Obesity, unspecified (Chronic)     Current regimen includes no medication at this time. Patient insurance does not cover any weight loss medication without comorbidity. Patient's current weight reported as 276. Due to lack of insurance coverage for weight loss medications and no recent lab work, plan to check A1c prior to making treatment decisions and success with lifestyle modifications, no medications to be started at this time.     Medication Changes:  Future Considerations:  Check A1c this week to review comorbid conditions.    Monitoring and Education:  Counseled patient on relevant medication mechanisms of action, expectations, side effects, duration of therapy, contraindications, administration, and monitoring parameters.  All questions and concerns addressed. Contact pharmacist with any further questions or concerns prior to next appointment.  Reviewed dietary recommendations: Healthy Plate method          BMI 40.0-44.9, adult (Multi)         Clinical Pharmacist follow-up: as needed, Telehealth visit    Continue all meds under the continuation of care with the referring provider and clinical pharmacy team.    Thank you,  Glory Garibay, PharmD  Clinical Pharmacist  [phone] (Office Phone)   [phone] (Fax)   [email or web address]     Verbal " consent to manage patient's drug therapy was obtained from the patient. They were informed they may decline to participate or withdraw from participation in pharmacy services at any time.

## 2025-02-26 NOTE — ASSESSMENT & PLAN NOTE
Current regimen includes no medication at this time. Patient insurance does not cover any weight loss medication without comorbidity. Patient's current weight reported as 276. Due to lack of insurance coverage for weight loss medications and no recent lab work, plan to check A1c prior to making treatment decisions and success with lifestyle modifications, no medications to be started at this time.     Medication Changes:  Future Considerations:  Check A1c this week to review comorbid conditions.    Monitoring and Education:  Counseled patient on relevant medication mechanisms of action, expectations, side effects, duration of therapy, contraindications, administration, and monitoring parameters.  All questions and concerns addressed. Contact pharmacist with any further questions or concerns prior to next appointment.  Reviewed dietary recommendations: Healthy Plate method

## 2025-04-22 ENCOUNTER — APPOINTMENT (OUTPATIENT)
Dept: PRIMARY CARE | Facility: CLINIC | Age: 40
End: 2025-04-22
Payer: MEDICARE

## 2025-04-22 VITALS
HEIGHT: 69 IN | BODY MASS INDEX: 40.76 KG/M2 | HEART RATE: 88 BPM | DIASTOLIC BLOOD PRESSURE: 79 MMHG | TEMPERATURE: 98.3 F | SYSTOLIC BLOOD PRESSURE: 134 MMHG | OXYGEN SATURATION: 96 % | WEIGHT: 275.2 LBS

## 2025-04-22 DIAGNOSIS — E55.9 VITAMIN D DEFICIENCY: ICD-10-CM

## 2025-04-22 DIAGNOSIS — Z00.00 ROUTINE GENERAL MEDICAL EXAMINATION AT A HEALTH CARE FACILITY: Primary | ICD-10-CM

## 2025-04-22 DIAGNOSIS — R53.83 OTHER FATIGUE: ICD-10-CM

## 2025-04-22 PROCEDURE — 99395 PREV VISIT EST AGE 18-39: CPT | Performed by: FAMILY MEDICINE

## 2025-04-22 PROCEDURE — 3008F BODY MASS INDEX DOCD: CPT | Performed by: FAMILY MEDICINE

## 2025-04-22 ASSESSMENT — ENCOUNTER SYMPTOMS
DEPRESSION: 0
LOSS OF SENSATION IN FEET: 0
OCCASIONAL FEELINGS OF UNSTEADINESS: 0

## 2025-04-22 ASSESSMENT — PATIENT HEALTH QUESTIONNAIRE - PHQ9
SUM OF ALL RESPONSES TO PHQ9 QUESTIONS 1 AND 2: 0
1. LITTLE INTEREST OR PLEASURE IN DOING THINGS: NOT AT ALL
2. FEELING DOWN, DEPRESSED OR HOPELESS: NOT AT ALL

## 2025-04-22 ASSESSMENT — PAIN SCALES - GENERAL: PAINLEVEL_OUTOF10: 0-NO PAIN

## 2025-04-22 NOTE — PROGRESS NOTES
"Subjective   Patient ID: Omari Mcneil is a 39 y.o. male who presents for physical.    HPI     Review of Systems   All other systems reviewed and are negative.      Objective   /79   Pulse 88   Temp 36.8 °C (98.3 °F)   Ht 1.753 m (5' 9\")   Wt 125 kg (275 lb 3.2 oz)   SpO2 96%   BMI 40.64 kg/m²     Physical Exam  Constitutional:       Appearance: Normal appearance.   Cardiovascular:      Rate and Rhythm: Regular rhythm.      Heart sounds: Normal heart sounds.   Pulmonary:      Breath sounds: Normal breath sounds.   Abdominal:      Palpations: Abdomen is soft.   Musculoskeletal:         General: Normal range of motion.   Skin:     General: Skin is warm.   Neurological:      General: No focal deficit present.   Psychiatric:         Mood and Affect: Mood normal.         Assessment/Plan     This is a 39-year-old male patient who is mentally challenged he is here for his annual physical    He is on Eliquis for DVT he has an upcoming appointment with the hematologist    He is very proudly stating that he is eating healthier and walking the dogs    He says his mother fixed him a big bowl of fruits and has been eating throughout the day    She also has upcoming appointment with the endocrinology weight loss clinic    I referred him to go to the lab to get his routine lab work done    Follow-up in 6 months         "

## 2025-05-16 ENCOUNTER — APPOINTMENT (OUTPATIENT)
Facility: CLINIC | Age: 40
End: 2025-05-16
Payer: MEDICARE

## 2025-05-16 VITALS
WEIGHT: 278.2 LBS | RESPIRATION RATE: 18 BRPM | HEART RATE: 75 BPM | BODY MASS INDEX: 41.2 KG/M2 | HEIGHT: 69 IN | DIASTOLIC BLOOD PRESSURE: 83 MMHG | OXYGEN SATURATION: 95 % | SYSTOLIC BLOOD PRESSURE: 121 MMHG

## 2025-05-16 DIAGNOSIS — G47.30 SLEEP DISORDER BREATHING: Primary | ICD-10-CM

## 2025-05-16 PROCEDURE — G2211 COMPLEX E/M VISIT ADD ON: HCPCS | Performed by: GENERAL PRACTICE

## 2025-05-16 PROCEDURE — 1036F TOBACCO NON-USER: CPT | Performed by: GENERAL PRACTICE

## 2025-05-16 PROCEDURE — 3008F BODY MASS INDEX DOCD: CPT | Performed by: GENERAL PRACTICE

## 2025-05-16 PROCEDURE — 99204 OFFICE O/P NEW MOD 45 MIN: CPT | Performed by: GENERAL PRACTICE

## 2025-05-16 ASSESSMENT — SLEEP AND FATIGUE QUESTIONNAIRES
SITING INACTIVE IN A PUBLIC PLACE LIKE A CLASS ROOM OR A MOVIE THEATER: SLIGHT CHANCE OF DOZING
SLEEP_PROBLEM_NOTICEABLE_TO_OTHERS: SOMEWHAT
HOW LIKELY ARE YOU TO NOD OFF OR FALL ASLEEP WHILE SITTING QUIETLY AFTER LUNCH WITHOUT ALCOHOL: WOULD NEVER DOZE
SATISFACTION_WITH_CURRENT_SLEEP_PATTERN: SATISFIED
HOW LIKELY ARE YOU TO NOD OFF OR FALL ASLEEP WHILE SITTING AND READING: WOULD NEVER DOZE
HOW LIKELY ARE YOU TO NOD OFF OR FALL ASLEEP WHILE SITTING AND TALKING TO SOMEONE: WOULD NEVER DOZE
HOW LIKELY ARE YOU TO NOD OFF OR FALL ASLEEP WHILE LYING DOWN TO REST IN THE AFTERNOON WHEN CIRCUMSTANCES PERMIT: HIGH CHANCE OF DOZING
HOW LIKELY ARE YOU TO NOD OFF OR FALL ASLEEP IN A CAR, WHILE STOPPED FOR A FEW MINUTES IN TRAFFIC: WOULD NEVER DOZE
WORRIED_DISTRESSED_DUE_TO_SLEEP: NOT AT ALL NOTICEABLE
ESS-CHAD TOTAL SCORE: 7
HOW LIKELY ARE YOU TO NOD OFF OR FALL ASLEEP WHILE WATCHING TV: HIGH CHANCE OF DOZING
SLEEP_PROBLEM_INTERFERES_DAILY_ACTIVITIES: NOT AT ALL NOTICEABLE
HOW LIKELY ARE YOU TO NOD OFF OR FALL ASLEEP WHEN YOU ARE A PASSENGER IN A CAR FOR AN HOUR WITHOUT A BREAK: WOULD NEVER DOZE

## 2025-05-16 ASSESSMENT — PATIENT HEALTH QUESTIONNAIRE - PHQ9
2. FEELING DOWN, DEPRESSED OR HOPELESS: NOT AT ALL
1. LITTLE INTEREST OR PLEASURE IN DOING THINGS: NOT AT ALL
SUM OF ALL RESPONSES TO PHQ9 QUESTIONS 1 AND 2: 0

## 2025-05-16 NOTE — PROGRESS NOTES
Patient: Omari Mcneil    [id number]  : 1985 -- AGE 39 y.o.    Provider: Shoaib Mccord DO     Location [facility]   Service Date: 2025              [facility]  New Visit Note        HISTORY OF PRESENT ILLNESS     The patient's referring provider is: Patricia Bonilla    HISTORY OF PRESENT ILLNESS   Omari Mcneil is a 39 y.o. male who presents to a [facility] for a sleep medicine evaluation with concerns of Snoring.     The patient  has no past medical history on file..    PAST SLEEP HISTORY    Pmhx includes obesity, DVT on eliquis, hx DD.     CURRENT HISTORY    On today's visit, 2025, the patient reports that he has been snoring for years but has not tried any interventions.     Sleep schedule  on weekdays / work days:  Usual Bedtime: 11:30 pm  Sleep latency: few min  Wake time : 7:30 am   Total sleep time average/day: 7 hours/day  Awakenings: 1x per night, nocturia, short.   Naps: daily, 3pm, 1-2hrs.     Sleep schedule  on weekends/non work days :  Same as above schedule.     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     Preferred sleeping position: SLEEP POSITION: supine, prone, and sidelying    Sleep-related ROS:    Snoring:  y  Witnessed apneas:  n      Gasping/ choking: n      Am Dry mouth:    y         Nasal congestion:    y     am headaches: n    Sleep is described as refreshing.     Daytime sleepiness: y  Fatigue or decreased energy: sometimes  Difficulty remembering things in daytime: n  Difficulty staying focused in daytime: : n  Irritable during the day: n    Drowsy driving: does not drive.     RLS screen:  RLSSCREEN: - Sensations: Patient does not have unusual sensations in their extremities that cause an urge to move them     Sleep-related behaviors: DENIES      ESS: 7         REVIEW OF SYSTEMS     REVIEW OF SYSTEMS  Review of Systems   All other systems  "reviewed and are negative.      ALLERGIES AND MEDICATIONS     ALLERGIES  Allergies[1]    MEDICATIONS  Current Medications[2]      PAST HISTORY     PAST MEDICAL HISTORY  He  has no past medical history on file.      PAST SURGICAL HISTORY:  Surgical History[3]    FAMILY HISTORY  Family History[4]  DOES/DOES NOT EC: does have a family history of sleep disorder.  Mother with hx of sleep apnea on pap therapy.     SOCIAL HISTORY  He  reports that he has never smoked. He has never used smokeless tobacco. He reports current alcohol use. He reports current drug use. Drug: Marijuana.     Caffeine consumption:  1 cup coffee 1x per month.   Alcohol consumption: occasionally   Smoking: n  Marijuana: occasionally recreationally.   Other drugs: n    PHYSICAL EXAM     VITAL SIGNS: /83   Pulse 75   Resp 18   Ht 1.753 m (5' 9\")   Wt 126 kg (278 lb 3.2 oz)   SpO2 95%   BMI 41.08 kg/m²      PREVIOUS WEIGHTS:  Wt Readings from Last 3 Encounters:   05/16/25 126 kg (278 lb 3.2 oz)   04/22/25 125 kg (275 lb 3.2 oz)   02/10/25 125 kg (276 lb 3.2 oz)       Physical Exam  Constitutional: Alert and oriented, cooperative, no obvious distress.   HENT: normocephalic.   Eyes: PERRLA, nonicteric   Neck: Supple, trachea midline   respiratory: CTA bilaterally, no wheezing/ crackles/ cough  Cardiac: no rub/ gallops  GI:BS in all 4 quadrants, Soft, nontender, no masses  musculoskeletal/ Extremities: No clubbing  integumentary: no significant rashes observed.   Neurologic: AOx3.   psychiatric: appropriate mood and affect.  Modified Mallampati: 4    RESULTS/DATA           ASSESSMENT/PLAN     Mr. Mcneil is a 39 y.o. male and  has no past medical history on file. He was referred to the [facility] for evaluation of suspected sleep apnea.     Problem List Items Addressed This Visit       BMI 40.0-44.9, adult (Multi)       Problem List and Orders    Pmhx includes obesity, DVT on eliquis, DD.     1- sleep disorder " breathing  Symptoms include snoring, nighttime awakenings, nocturia, morning dry mouth, sometimes daytime sleepiness and takes naps.    Mother with history of sleep apnea    -ordered sleep study    -do not drive or operate heavy machinery if drowsy.  -avoid sedating substances/ medication, alcohol, illicit drugs and tobacco.    2- Obesity  counseled on eating a healthy diet and exercising as tolerated.  Has an apt with endocrinology weight loss clinic.       Follow up after sleep study or sooner as needed.          [1] No Known Allergies  [2]   Current Outpatient Medications   Medication Sig Dispense Refill    apixaban (Eliquis) 5 mg tablet Take 2 tablets (10 mg) by mouth 2 times a day for 7 days, THEN 1 tablet (5 mg) 2 times a day for 23 days. 74 tablet 0     No current facility-administered medications for this visit.   [3]   Past Surgical History:  Procedure Laterality Date    NO PAST SURGERIES     [4]   Family History  Problem Relation Name Age of Onset    Heart attack Mother      Hypothyroidism Mother      Other (prediabetes) Mother      Uterine cancer Maternal Grandmother      Colon cancer Maternal Grandfather      Hypertension Paternal Grandfather

## 2025-05-17 LAB
25(OH)D3+25(OH)D2 SERPL-MCNC: 33 NG/ML (ref 30–100)
ALBUMIN SERPL-MCNC: 4.2 G/DL (ref 3.6–5.1)
ALP SERPL-CCNC: 46 U/L (ref 36–130)
ALT SERPL-CCNC: 17 U/L (ref 9–46)
ANION GAP SERPL CALCULATED.4IONS-SCNC: 10 MMOL/L (CALC) (ref 7–17)
AST SERPL-CCNC: 20 U/L (ref 10–40)
BASOPHILS # BLD AUTO: 41 CELLS/UL (ref 0–200)
BASOPHILS NFR BLD AUTO: 0.7 %
BILIRUB SERPL-MCNC: 0.6 MG/DL (ref 0.2–1.2)
BUN SERPL-MCNC: 20 MG/DL (ref 7–25)
CALCIUM SERPL-MCNC: 9.3 MG/DL (ref 8.6–10.3)
CHLORIDE SERPL-SCNC: 106 MMOL/L (ref 98–110)
CHOLEST SERPL-MCNC: 178 MG/DL
CHOLEST/HDLC SERPL: 4.1 (CALC)
CO2 SERPL-SCNC: 22 MMOL/L (ref 20–32)
CREAT SERPL-MCNC: 1.11 MG/DL (ref 0.6–1.26)
EGFRCR SERPLBLD CKD-EPI 2021: 88 ML/MIN/1.73M2
EOSINOPHIL # BLD AUTO: 313 CELLS/UL (ref 15–500)
EOSINOPHIL NFR BLD AUTO: 5.4 %
ERYTHROCYTE [DISTWIDTH] IN BLOOD BY AUTOMATED COUNT: 15 % (ref 11–15)
GLUCOSE SERPL-MCNC: 83 MG/DL (ref 65–99)
HCT VFR BLD AUTO: 43 % (ref 38.5–50)
HDLC SERPL-MCNC: 43 MG/DL
HGB BLD-MCNC: 13.8 G/DL (ref 13.2–17.1)
LDLC SERPL CALC-MCNC: 113 MG/DL (CALC)
LYMPHOCYTES # BLD AUTO: 2233 CELLS/UL (ref 850–3900)
LYMPHOCYTES NFR BLD AUTO: 38.5 %
MCH RBC QN AUTO: 27.5 PG (ref 27–33)
MCHC RBC AUTO-ENTMCNC: 32.1 G/DL (ref 32–36)
MCV RBC AUTO: 85.7 FL (ref 80–100)
MONOCYTES # BLD AUTO: 365 CELLS/UL (ref 200–950)
MONOCYTES NFR BLD AUTO: 6.3 %
NEUTROPHILS # BLD AUTO: 2848 CELLS/UL (ref 1500–7800)
NEUTROPHILS NFR BLD AUTO: 49.1 %
NONHDLC SERPL-MCNC: 135 MG/DL (CALC)
PLATELET # BLD AUTO: 259 THOUSAND/UL (ref 140–400)
PMV BLD REES-ECKER: 9.4 FL (ref 7.5–12.5)
POTASSIUM SERPL-SCNC: 4.3 MMOL/L (ref 3.5–5.3)
PROT SERPL-MCNC: 7.9 G/DL (ref 6.1–8.1)
RBC # BLD AUTO: 5.02 MILLION/UL (ref 4.2–5.8)
SODIUM SERPL-SCNC: 138 MMOL/L (ref 135–146)
TRIGL SERPL-MCNC: 110 MG/DL
TSH SERPL-ACNC: 0.94 MIU/L (ref 0.4–4.5)
WBC # BLD AUTO: 5.8 THOUSAND/UL (ref 3.8–10.8)

## 2025-06-25 ENCOUNTER — APPOINTMENT (OUTPATIENT)
Dept: ENDOCRINOLOGY | Facility: CLINIC | Age: 40
End: 2025-06-25
Payer: MEDICARE

## 2025-07-08 ENCOUNTER — TELEMEDICINE CLINICAL SUPPORT (OUTPATIENT)
Dept: NUTRITION | Facility: CLINIC | Age: 40
End: 2025-07-08
Payer: MEDICARE

## 2025-07-08 VITALS — HEIGHT: 69 IN | BODY MASS INDEX: 40.29 KG/M2 | WEIGHT: 272 LBS

## 2025-07-08 DIAGNOSIS — E78.9 LIPID DISORDER: Primary | ICD-10-CM

## 2025-07-08 PROCEDURE — 97802 MEDICAL NUTRITION INDIV IN: CPT | Mod: 95

## 2025-07-08 NOTE — PATIENT INSTRUCTIONS
Follow the Healthy Plate Method at meals- see handout.  This will help to increase your vegetable intake and decrease portion sizes of carbohydrates.   - Check out Dim[employer] D[employer] or [employer] for Mediterranean meal plans and recipes ideas.    When eating starchy foods, try to eat whole grains like potato with the skin, whole grain breads and cereals, brown rices and pastas.  Limit portions to 1 cup or less.   Include protein with all meals and snacks.  Lean protein are better for cholesterol levels such as chicken(no skin), fish (baked or broiled or grilled), low-fat dairy, eggs, and peanut butter or nuts.    - For high protein snack ideas check out: https://www.AmberWave.com/article/920800/10-high-protein-snacks-that-keep-you-feeling-full-longer/  - Protein drinks between meals such as Premier Protein, Muscle Milk or Fairlife can help curb appetite.  4.   Aim to lose 1# per week to reach your goal weight.      - see sample meal plans for ideas.   6.   Aim to drink 64 oz of water daily  7.   Aim for 30 minutes of exercise on most days.

## 2025-07-08 NOTE — PROGRESS NOTES
"Nutrition Assessment     Reason for Visit:  Omari Mcneil is a 39 y.o. male who presents for nutrition counseling in the context of weight management.   Anthropometrics:  Wt Readings from Last 10 Encounters:   07/08/25 123 kg (272 lb)   05/16/25 126 kg (278 lb 3.2 oz)   04/22/25 125 kg (275 lb 3.2 oz)   02/10/25 125 kg (276 lb 3.2 oz)   02/08/25 123 kg (272 lb)   02/07/25 123 kg (272 lb)   05/14/24 124 kg (272 lb 11.2 oz)   12/20/23 128 kg (282 lb)   06/08/23 135 kg (297 lb)     Anthropometrics  Height: 175.3 cm (5' 9\")  Weight: 123 kg (272 lb)    Body mass index is 40.17 kg/m².    - pending sleep study  Lab Results   Component Value Date    HGBA1C 5.7 (H) 02/21/2025       Food And Nutrient Intake:  Food and Nutrient History  Food and Nutrient History: Pt presents for nutrition counseling in the context of weight management.  Pt had a recent DVT which may be linked to obesity- class III.  Pt's mother is providing much of his history as he is mentally challenged.  Pt will be having a sleep study as well as seeing endocrinology for possible medical management of weight.  HgbA1c indicates pre-diabetes.  Pt's weight has decreased approx 6# over the past 2 months with more walking and diet changes.  Mom reports that she and pt lost a significant (100#) amount of weight in the past by following the keto diet but had trouble sustaining the diet.  Pt may benefit from following a more balanced approach ~ Healthy Plate model at meals.  Suggested that pt incorporate high protein snacks between meals to help curb appetite.  Will provide with Healthy snack lists and sample meals plans.  Sleep Duration/Quality: 7+ hrs continuous     Food Intake  Meal 1: breakfast: bowl of cereal and banana or eggs and pancake  Meal 2: lunch: skips or fast foods  Snacks : peanuts or chips  Meal 3: dinner: protein and starches; vegetables  Snacks: evening: sometimes  Fluid Intake: water or tea; no pop  Pattern of Alcohol Consumption: " "none    Micronutrient Intake  Vitamin Intake: D    Food Supplement Intake  Vitamin Intake: D    Medication and Complementary/Alternative Medicine Use  OTC Medication Use: fish oil    Physical Activities:  Physical Activity  Frequency (times/week): 7 (times/week)  Duration (minutes/day): 45 minutes/day  Type of Physical Activity: gym; walks       Nutrition Focused Physical Exam:  Deferred- telehealth visit       Energy Needs  Calculated Energy Needs Using Equations  Height: 175.3 cm (5' 9\")  Weight Used for Equation Calculations: 123 kg (271 lb 2.7 oz)  [facility] [place] Equation (Overweight or Obese Patients): 2145  Activity Factor: 1.4  Total Energy Needs: 3003  Estimated Energy Needs  Total Energy Estimated Needs in 24 hours (kCal): 2003 kCal  Method for Estimating Needs: MSJ x 1.4-1000 kcals    Nutrition Diagnosis   Malnutrition Diagnosis  Patient has Malnutrition Diagnosis: No  Nutrition Diagnosis  Patient has Nutrition Diagnosis: Yes  Diagnosis Status (1): New  Nutrition Diagnosis 1: Food and nutrition related knowledge deficit  Related to (1): limited or lack of prior nutrition-related education  As Evidenced by (1): per pt report looking for guidance on healthy eating patterns for gradual weight loss    Nutrition Interventions/Recommendations   Food and Nutrition Delivery  Meals & Snacks: General Healthful Diet, Energy-modified diet, Protein-modified diet  Goals: Healthy Plate Model; 2000 kcals/day; High protein snacks  Vitamin Supplement Therapy: Vitamin D supplement therapy  Nutrition Education  Nutrition Education Content: Content related nutrition education, Education on nutrition's influence on health, Physical activity guidance  Goals: Instructed on counting macronutrient intake, proper portion sizes, and general healthful nutrition. Instructed on benefits of wt loss with diabetes and heart health. Discussed mindful eating, slow gradual wt loss and goals beyond wt. Instructed pt to not skip meals - " discussed this may lead to over eating later or snacking more than planned. Instructed on importance of physical activity for wt loss and maintenance of wt loss. Both verbal and written education provided in the one-on-one setting. Pt verbalized understanding of information provided. All questions answered to pt satisfaction throughout visit  Nutrition Counseling  Nutrition Counseling Strategies : Nutrition counseling based on goal setting strategy, Nutrition counseling based on problem solving strategy        Patient Instructions   Follow the Healthy Plate Method at meals- see handout.  This will help to increase your vegetable intake and decrease portion sizes of carbohydrates.   - Check out [employer] or [employer] for Mediterranean meal plans and recipes ideas.    When eating starchy foods, try to eat whole grains like potato with the skin, whole grain breads and cereals, brown rices and pastas.  Limit portions to 1 cup or less.   Include protein with all meals and snacks.  Lean protein are better for cholesterol levels such as chicken(no skin), fish (baked or broiled or grilled), low-fat dairy, eggs, and peanut butter or nuts.    - For high protein snack ideas check out: https://www.June Blackbox.Genometry/article/684402/10-high-protein-snacks-that-keep-you-feeling-full-longer/  - Protein drinks between meals such as Premier Protein, Muscle Milk or Fairlife can help curb appetite.  4.   Aim to lose 1# per week to reach your goal weight.      - see sample meal plans for ideas.   6.   Aim to drink 64 oz of water daily  7.   Aim for 30 minutes of exercise on most days.         Nutrition Monitoring and Evaluation   Food and Nutrient Intake  Monitoring and Evaluation Plan: Meal/snack pattern  Meal/Snack Pattern: Estimated meal and snack pattern  Criteria: Monitor patient's progress towards meal and snack goal(s)  Anthropometric measurements  Monitoring and Evaluation Plan: Weight  Body Weight: Measured body  weight  Criteria: Monitor patient's progress towards intentional weight loss of 0.5-2 lb per week, trending toward a clinically significant weight loss of 5-10% of current body weight.  Biochemical Data, Medical Tests and Procedures  Monitoring and Evaluation Plan: Glucose/endocrine profile  Glucose/Endocrine Profile: Hemoglobin A1c (HgbA1c)  Criteria: <5.7%      Readiness to Change : Good  Level of Understanding : Fair  Anticipated Compliant : Good

## 2025-07-10 ENCOUNTER — CLINICAL SUPPORT (OUTPATIENT)
Dept: SLEEP MEDICINE | Facility: CLINIC | Age: 40
End: 2025-07-10
Payer: MEDICARE

## 2025-07-10 VITALS — BODY MASS INDEX: 40.16 KG/M2 | HEIGHT: 69 IN | WEIGHT: 271.17 LBS

## 2025-07-10 DIAGNOSIS — G47.30 SLEEP DISORDER BREATHING: ICD-10-CM

## 2025-07-11 ASSESSMENT — SLEEP AND FATIGUE QUESTIONNAIRES
HOW LIKELY ARE YOU TO NOD OFF OR FALL ASLEEP WHILE SITTING AND READING: WOULD NEVER DOZE
HOW LIKELY ARE YOU TO NOD OFF OR FALL ASLEEP WHILE WATCHING TV: SLIGHT CHANCE OF DOZING
HOW LIKELY ARE YOU TO NOD OFF OR FALL ASLEEP WHILE SITTING QUIETLY AFTER LUNCH WITHOUT ALCOHOL: WOULD NEVER DOZE
ESS-CHAD TOTAL SCORE: 3
HOW LIKELY ARE YOU TO NOD OFF OR FALL ASLEEP WHILE SITTING AND TALKING TO SOMEONE: WOULD NEVER DOZE
HOW LIKELY ARE YOU TO NOD OFF OR FALL ASLEEP WHEN YOU ARE A PASSENGER IN A CAR FOR AN HOUR WITHOUT A BREAK: WOULD NEVER DOZE
HOW LIKELY ARE YOU TO NOD OFF OR FALL ASLEEP IN A CAR, WHILE STOPPED FOR A FEW MINUTES IN TRAFFIC: WOULD NEVER DOZE
HOW LIKELY ARE YOU TO NOD OFF OR FALL ASLEEP WHILE LYING DOWN TO REST IN THE AFTERNOON WHEN CIRCUMSTANCES PERMIT: MODERATE CHANCE OF DOZING
SITING INACTIVE IN A PUBLIC PLACE LIKE A CLASS ROOM OR A MOVIE THEATER: WOULD NEVER DOZE

## 2025-07-11 NOTE — PROGRESS NOTES
[facility] TECH NOTE:     Patient: Omari Mcneil   MRN//AGE: [id number]  1985  39 y.o.   Technologist: Shireen Newell   Room: 2   Service Date: 7/10/2025        Sleep Testing Location: [facility] [place]: 3    TECHNOLOGIST SLEEP STUDY PROCEDURE NOTE:   This sleep study is being conducted according to the policies and procedures outlined by the AAS accreditation standards.  The sleep study procedure and processes involved during this appointment was explained to the patient/patient’s family, questions were answered. The patient/family verbalized understanding.      The patient is a 39 y.o. year old male scheduled for a Diagnostic PSG Split night with montage of: Standard PSG CPAP.He arrived for his appointment.      The study that was ultimately completed was a Diagnostic PSG Split night with montage of: Standard PSG CPAP.    The full study Was completed.  Patient questionnaires completed?: Yes    Consents signed? Yes    Initial Fall Risk Screening:     Omari has not fallen in the last 6 months.  Omari does not have a fear of falling. He does not need assistance with sitting, standing, or walking. He does not need assistance walking in his home. He does not need assistance in an unfamiliar setting. The patient is not using an assistive device.     Brief Study observations: Supine sleep encouraged. Patient and mother educated on CPAP prior to study. Positional respiratory events observed. Patient met split criteria, CPAP initiated using a ResMed AirFit P10 nasal pillows size medium. EPR 2 added due to persistent central apnea in REM sleep. Patient tolerate CPAP well.     Deviation to order/protocol and reason: N/A      If PAP, which was preferred mask/pressure/mode: ResMed AirFit P10 nasal pillows size medium      Other:None    After the procedure, the patient/family was informed to ensure follow up with ordering clinician for testing results.      Technologist: Shireen Newell

## 2025-07-30 ENCOUNTER — APPOINTMENT (OUTPATIENT)
Dept: HEMATOLOGY/ONCOLOGY | Facility: CLINIC | Age: 40
End: 2025-07-30
Payer: MEDICARE

## 2025-08-06 ENCOUNTER — OFFICE VISIT (OUTPATIENT)
Dept: HEMATOLOGY/ONCOLOGY | Facility: CLINIC | Age: 40
End: 2025-08-06
Payer: MEDICARE

## 2025-08-06 ENCOUNTER — LAB (OUTPATIENT)
Dept: LAB | Facility: CLINIC | Age: 40
End: 2025-08-06
Payer: MEDICARE

## 2025-08-06 VITALS
OXYGEN SATURATION: 97 % | HEART RATE: 80 BPM | RESPIRATION RATE: 16 BRPM | SYSTOLIC BLOOD PRESSURE: 113 MMHG | WEIGHT: 275.57 LBS | BODY MASS INDEX: 40.7 KG/M2 | TEMPERATURE: 97.9 F | DIASTOLIC BLOOD PRESSURE: 74 MMHG

## 2025-08-06 DIAGNOSIS — G47.33 OBSTRUCTIVE SLEEP APNEA: ICD-10-CM

## 2025-08-06 DIAGNOSIS — I82.411 ACUTE DEEP VEIN THROMBOSIS (DVT) OF FEMORAL VEIN OF RIGHT LOWER EXTREMITY: Primary | ICD-10-CM

## 2025-08-06 DIAGNOSIS — I82.411 ACUTE DEEP VEIN THROMBOSIS (DVT) OF FEMORAL VEIN OF RIGHT LOWER EXTREMITY: ICD-10-CM

## 2025-08-06 PROCEDURE — 85301 ANTITHROMBIN III ANTIGEN: CPT

## 2025-08-06 PROCEDURE — 36415 COLL VENOUS BLD VENIPUNCTURE: CPT

## 2025-08-06 PROCEDURE — 86147 CARDIOLIPIN ANTIBODY EA IG: CPT

## 2025-08-06 PROCEDURE — 85613 RUSSELL VIPER VENOM DILUTED: CPT

## 2025-08-06 PROCEDURE — 99204 OFFICE O/P NEW MOD 45 MIN: CPT | Performed by: INTERNAL MEDICINE

## 2025-08-06 PROCEDURE — 99214 OFFICE O/P EST MOD 30 MIN: CPT | Mod: 25 | Performed by: INTERNAL MEDICINE

## 2025-08-06 PROCEDURE — 85302 CLOT INHIBIT PROT C ANTIGEN: CPT

## 2025-08-06 PROCEDURE — G2211 COMPLEX E/M VISIT ADD ON: HCPCS | Performed by: INTERNAL MEDICINE

## 2025-08-06 PROCEDURE — 86146 BETA-2 GLYCOPROTEIN ANTIBODY: CPT

## 2025-08-06 PROCEDURE — 85305 CLOT INHIBIT PROT S TOTAL: CPT

## 2025-08-06 ASSESSMENT — PAIN SCALES - GENERAL: PAINLEVEL_OUTOF10: 0-NO PAIN

## 2025-08-07 LAB
B2 GLYCOPROT1 IGA SER-ACNC: 0.8 U/ML
B2 GLYCOPROT1 IGG SER-ACNC: <1.4 U/ML
B2 GLYCOPROT1 IGM SER-ACNC: 1.4 U/ML
CARDIOLIPIN IGA SERPL-ACNC: 1.3 APL U/ML
CARDIOLIPIN IGG SER IA-ACNC: <1.6 GPL U/ML
CARDIOLIPIN IGM SER IA-ACNC: 2 MPL U/ML
DRVVT SCREEN TO CONFIRM RATIO: 1.13 RATIO
DRVVT/DRVVT CFM NRMLZD PPP-RTO: 1.05 RATIO
DRVVT/DRVVT CFM P DOAC NEUT NORM PPP-RTO: 1.07 RATIO

## 2025-08-08 LAB
AT III AG ACT/NOR PPP IA: 79 % (ref 82–136)
PROT C AG ACT/NOR PPP IA: >95 % (ref 63–153)
PROT S AG ACT/NOR PPP IA: 137 % (ref 84–134)

## 2025-08-11 ENCOUNTER — HOSPITAL ENCOUNTER (OUTPATIENT)
Dept: RADIOLOGY | Facility: CLINIC | Age: 40
Discharge: HOME | End: 2025-08-11
Payer: MEDICARE

## 2025-08-11 DIAGNOSIS — I82.411 ACUTE DEEP VEIN THROMBOSIS (DVT) OF FEMORAL VEIN OF RIGHT LOWER EXTREMITY: ICD-10-CM

## 2025-08-11 LAB
ELECTRONICALLY SIGNED BY: NORMAL
ELECTRONICALLY SIGNED BY: NORMAL
FACTOR II-PROTHROMBIN INTERPRETATION: NORMAL
FACTOR II-PROTHROMBIN RESULT: NORMAL
FACTOR V LEIDEN INTERPRETATION: NORMAL
FACTOR V LEIDEN RESULT: NORMAL

## 2025-08-11 PROCEDURE — 93971 EXTREMITY STUDY: CPT | Performed by: RADIOLOGY

## 2025-08-11 PROCEDURE — 93970 EXTREMITY STUDY: CPT

## 2025-08-12 LAB
AT III ACT/NOR PPP CHRO: 83 % ACTIVITY (ref 80–130)
PROT C ACT/NOR PPP CHRO: 86 % ACTIVITY (ref 70–150)
PROT S ACT/NOR PPP: 123 % ACTIVITY (ref 64–150)

## 2025-08-18 ENCOUNTER — APPOINTMENT (OUTPATIENT)
Facility: CLINIC | Age: 40
End: 2025-08-18
Payer: MEDICARE

## 2025-08-19 PROBLEM — G47.33 OBSTRUCTIVE SLEEP APNEA: Status: ACTIVE | Noted: 2025-08-19

## 2025-08-19 PROBLEM — I82.411 ACUTE DEEP VEIN THROMBOSIS (DVT) OF FEMORAL VEIN OF RIGHT LOWER EXTREMITY: Status: ACTIVE | Noted: 2025-08-19

## 2025-08-19 ASSESSMENT — ENCOUNTER SYMPTOMS
LEG SWELLING: 1
RESPIRATORY NEGATIVE: 1
HEMATOLOGIC/LYMPHATIC NEGATIVE: 1
PSYCHIATRIC NEGATIVE: 1
CONSTITUTIONAL NEGATIVE: 1
NEUROLOGICAL NEGATIVE: 1
MUSCULOSKELETAL NEGATIVE: 1
ENDOCRINE NEGATIVE: 1
GASTROINTESTINAL NEGATIVE: 1
EYES NEGATIVE: 1

## 2025-08-20 ENCOUNTER — TELEMEDICINE (OUTPATIENT)
Dept: HEMATOLOGY/ONCOLOGY | Facility: CLINIC | Age: 40
End: 2025-08-20
Payer: MEDICARE

## 2025-08-20 DIAGNOSIS — I82.411 ACUTE DEEP VEIN THROMBOSIS (DVT) OF FEMORAL VEIN OF RIGHT LOWER EXTREMITY: ICD-10-CM

## 2025-09-01 ASSESSMENT — ENCOUNTER SYMPTOMS
CONSTITUTIONAL NEGATIVE: 1
CARDIOVASCULAR NEGATIVE: 1
RESPIRATORY NEGATIVE: 1
GASTROINTESTINAL NEGATIVE: 1
NEUROLOGICAL NEGATIVE: 1
ENDOCRINE NEGATIVE: 1
EYES NEGATIVE: 1
HEMATOLOGIC/LYMPHATIC NEGATIVE: 1
NERVOUS/ANXIOUS: 1
MUSCULOSKELETAL NEGATIVE: 1

## 2025-09-22 ENCOUNTER — APPOINTMENT (OUTPATIENT)
Facility: CLINIC | Age: 40
End: 2025-09-22
Payer: MEDICARE

## 2025-12-11 ENCOUNTER — APPOINTMENT (OUTPATIENT)
Facility: CLINIC | Age: 40
End: 2025-12-11
Payer: MEDICARE